# Patient Record
Sex: FEMALE | Race: OTHER | NOT HISPANIC OR LATINO
[De-identification: names, ages, dates, MRNs, and addresses within clinical notes are randomized per-mention and may not be internally consistent; named-entity substitution may affect disease eponyms.]

---

## 2022-01-06 ENCOUNTER — APPOINTMENT (OUTPATIENT)
Dept: SURGERY | Facility: CLINIC | Age: 52
End: 2022-01-06
Payer: COMMERCIAL

## 2022-01-06 VITALS
HEIGHT: 62 IN | HEART RATE: 94 BPM | SYSTOLIC BLOOD PRESSURE: 180 MMHG | WEIGHT: 143 LBS | BODY MASS INDEX: 26.31 KG/M2 | OXYGEN SATURATION: 98 % | DIASTOLIC BLOOD PRESSURE: 95 MMHG | TEMPERATURE: 98 F

## 2022-01-06 DIAGNOSIS — Z00.00 ENCOUNTER FOR GENERAL ADULT MEDICAL EXAMINATION W/OUT ABNORMAL FINDINGS: ICD-10-CM

## 2022-01-06 PROCEDURE — 99215 OFFICE O/P EST HI 40 MIN: CPT

## 2022-01-06 NOTE — HISTORY OF PRESENT ILLNESS
[FreeTextEntry1] : Patient referred by Dr. Martinez-primary\par \par I had the pleasure of seeing Dania Chowdhury in the office for a new visit breast evaluation secondary to recently diagnosed left breast atypical ductal hyperplasia.\par \par Dania is a zenon 50 yo perimenopausal female who has been in her usual state of health.  She denies dominant breast mass, skin changes or nipple discharge.\par \par She underwent abnormal breast imaging which demonstrated left breast upper outer quadrant calcifications. A stereotactic core biopsy demonstrated calcifications with atypical ductal hyperplasia.\par \par Pathology: 12/27/2021 Left breast upper outer quadrant- atypical ductal hyperplasia with calcifications. \par \par Imaging: \par 11/17/2021 Left diagnostic mammogram - left tight grouping of indeterminate calcifications \par BIRADS 4\par 10/18/2021 Bilateral screening mammogram with tomosynthesis\par Impression: 3mm group of calcifications in the mid depth left upper outer quadrant approximately 6cm from the nipple. BIRADS 0: Needs additional imaging\par \par \par The patient understands that the result of the biopsy is not carcinoma but a precursor to cancer which indicates that there is an elevated lifetime risk of developing breast carcinoma. We discussed the option of surgical excision to rule out carcinoma and the technical aspects of requiring a localization device, such as a wire, MAHAMED  or magseed.  She understands that goal of the surgery is to remove the area that was biopsied to rule out carcinoma. Risks v benefits were reviewed and discussed as well as alternative to surgical biopsy. All questions were answered.\par \par We also discussed chemoprevention and rationale of 5 years of tamoxifen or other agent to reduce to risk of developing hormone positive breast cancer by up to 50%. \par \par We also discussed high risk monitoring with mammogram/ultrasound and +/- MRI q 6 months along with clinical examination annually. \par \par She agrees to move forward with left breast localized excisional biopsy.

## 2022-01-06 NOTE — ASSESSMENT
[FreeTextEntry1] : 50 yo female presents with abnormal breast imaging from 10/2021 and 11/2021 demonstrating left breast microcalcifications that were biopsied as ADH. Discussion regarding surgical excision to rule out carcinoma, and referral to medical oncology for chemoprevention was had. The patient is moving forward with excisional biopsy.\par 1. Andie  left breast excisional biopsy\par 2. Referral to Dr. Martinez-chemoprevention\par 3. if biopsy demonstrates ADH and no carcinoma, patient will be enrolled in high risk surveillance program

## 2022-01-06 NOTE — PHYSICAL EXAM
[Normocephalic] : normocephalic [Atraumatic] : atraumatic [EOMI] : extra ocular movement intact [PERRL] : pupils equal, round and reactive to light [Sclera nonicteric] : sclera nonicteric [Supple] : supple [No Supraclavicular Adenopathy] : no supraclavicular adenopathy [Examined in the supine and seated position] : examined in the supine and seated position [No dominant masses] : no dominant masses in right breast  [No dominant masses] : no dominant masses left breast [No Nipple Retraction] : no left nipple retraction [No Nipple Discharge] : no left nipple discharge [No Axillary Lymphadenopathy] : no left axillary lymphadenopathy [No Edema] : no edema [No Rashes] : no rashes [No Ulceration] : no ulceration [Breast Nipple Inversion] : nipples not inverted [Breast Nipple Retraction] : nipples not retracted [Breast Nipple Flattening] : nipples not flattened [Breast Nipple Fissures] : nipples not fissured [Breast Abnormal Lactation (Galactorrhea)] : no galactorrhea [Breast Abnormal Secretion Bloody Fluid] : no bloody discharge [Breast Abnormal Secretion Serous Fluid] : no serous discharge [Breast Abnormal Secretion Opalescent Fluid] : no milky discharge [de-identified] : No supraclavicular or axillary adenopathy. No dominant breast mass, normal to palpation. Everted nipple without discharge. No skin changes. [de-identified] : No supraclavicular or axillary adenopathy. No dominant breast mass, normal to palpation. Everted nipple without discharge. No skin changes.

## 2022-01-27 ENCOUNTER — OUTPATIENT (OUTPATIENT)
Dept: OUTPATIENT SERVICES | Facility: HOSPITAL | Age: 52
LOS: 1 days | Discharge: HOME | End: 2022-01-27

## 2022-01-27 DIAGNOSIS — N60.92 UNSPECIFIED BENIGN MAMMARY DYSPLASIA OF LEFT BREAST: ICD-10-CM

## 2022-01-28 ENCOUNTER — RESULT REVIEW (OUTPATIENT)
Age: 52
End: 2022-01-28

## 2022-01-28 LAB — SURGICAL PATHOLOGY STUDY: SIGNIFICANT CHANGE UP

## 2022-02-08 ENCOUNTER — OUTPATIENT (OUTPATIENT)
Dept: OUTPATIENT SERVICES | Facility: HOSPITAL | Age: 52
LOS: 1 days | End: 2022-02-08
Payer: COMMERCIAL

## 2022-02-08 VITALS
HEART RATE: 93 BPM | SYSTOLIC BLOOD PRESSURE: 130 MMHG | WEIGHT: 142.86 LBS | HEIGHT: 62 IN | DIASTOLIC BLOOD PRESSURE: 70 MMHG | RESPIRATION RATE: 16 BRPM | TEMPERATURE: 98 F | OXYGEN SATURATION: 98 %

## 2022-02-08 DIAGNOSIS — N60.92 UNSPECIFIED BENIGN MAMMARY DYSPLASIA OF LEFT BREAST: ICD-10-CM

## 2022-02-08 DIAGNOSIS — Z29.9 ENCOUNTER FOR PROPHYLACTIC MEASURES, UNSPECIFIED: ICD-10-CM

## 2022-02-08 DIAGNOSIS — E11.9 TYPE 2 DIABETES MELLITUS WITHOUT COMPLICATIONS: ICD-10-CM

## 2022-02-08 DIAGNOSIS — E03.9 HYPOTHYROIDISM, UNSPECIFIED: ICD-10-CM

## 2022-02-08 DIAGNOSIS — Z13.89 ENCOUNTER FOR SCREENING FOR OTHER DISORDER: ICD-10-CM

## 2022-02-08 DIAGNOSIS — Z98.891 HISTORY OF UTERINE SCAR FROM PREVIOUS SURGERY: Chronic | ICD-10-CM

## 2022-02-08 DIAGNOSIS — Z92.29 PERSONAL HISTORY OF OTHER DRUG THERAPY: ICD-10-CM

## 2022-02-08 DIAGNOSIS — I10 ESSENTIAL (PRIMARY) HYPERTENSION: ICD-10-CM

## 2022-02-08 DIAGNOSIS — Z01.818 ENCOUNTER FOR OTHER PREPROCEDURAL EXAMINATION: ICD-10-CM

## 2022-02-08 DIAGNOSIS — O00.109 UNSPECIFIED TUBAL PREGNANCY WITHOUT INTRAUTERINE PREGNANCY: Chronic | ICD-10-CM

## 2022-02-08 LAB
A1C WITH ESTIMATED AVERAGE GLUCOSE RESULT: 7.7 % — HIGH (ref 4–5.6)
ANION GAP SERPL CALC-SCNC: 13 MMOL/L — SIGNIFICANT CHANGE UP (ref 5–17)
APTT BLD: 34 SEC — SIGNIFICANT CHANGE UP (ref 27.5–35.5)
BASOPHILS # BLD AUTO: 0.05 K/UL — SIGNIFICANT CHANGE UP (ref 0–0.2)
BASOPHILS NFR BLD AUTO: 0.6 % — SIGNIFICANT CHANGE UP (ref 0–2)
BLD GP AB SCN SERPL QL: SIGNIFICANT CHANGE UP
BUN SERPL-MCNC: 11.1 MG/DL — SIGNIFICANT CHANGE UP (ref 8–20)
CALCIUM SERPL-MCNC: 9.6 MG/DL — SIGNIFICANT CHANGE UP (ref 8.6–10.2)
CHLORIDE SERPL-SCNC: 100 MMOL/L — SIGNIFICANT CHANGE UP (ref 98–107)
CO2 SERPL-SCNC: 27 MMOL/L — SIGNIFICANT CHANGE UP (ref 22–29)
CREAT SERPL-MCNC: 0.55 MG/DL — SIGNIFICANT CHANGE UP (ref 0.5–1.3)
EOSINOPHIL # BLD AUTO: 0.25 K/UL — SIGNIFICANT CHANGE UP (ref 0–0.5)
EOSINOPHIL NFR BLD AUTO: 2.8 % — SIGNIFICANT CHANGE UP (ref 0–6)
ESTIMATED AVERAGE GLUCOSE: 174 MG/DL — HIGH (ref 68–114)
GLUCOSE SERPL-MCNC: 231 MG/DL — HIGH (ref 70–99)
HCG SERPL-ACNC: <4 MIU/ML — SIGNIFICANT CHANGE UP
HCT VFR BLD CALC: 40 % — SIGNIFICANT CHANGE UP (ref 34.5–45)
HGB BLD-MCNC: 12.6 G/DL — SIGNIFICANT CHANGE UP (ref 11.5–15.5)
IMM GRANULOCYTES NFR BLD AUTO: 0.5 % — SIGNIFICANT CHANGE UP (ref 0–1.5)
INR BLD: 1.06 RATIO — SIGNIFICANT CHANGE UP (ref 0.88–1.16)
LYMPHOCYTES # BLD AUTO: 2.86 K/UL — SIGNIFICANT CHANGE UP (ref 1–3.3)
LYMPHOCYTES # BLD AUTO: 32.6 % — SIGNIFICANT CHANGE UP (ref 13–44)
MCHC RBC-ENTMCNC: 26.6 PG — LOW (ref 27–34)
MCHC RBC-ENTMCNC: 31.5 GM/DL — LOW (ref 32–36)
MCV RBC AUTO: 84.4 FL — SIGNIFICANT CHANGE UP (ref 80–100)
MONOCYTES # BLD AUTO: 0.48 K/UL — SIGNIFICANT CHANGE UP (ref 0–0.9)
MONOCYTES NFR BLD AUTO: 5.5 % — SIGNIFICANT CHANGE UP (ref 2–14)
NEUTROPHILS # BLD AUTO: 5.1 K/UL — SIGNIFICANT CHANGE UP (ref 1.8–7.4)
NEUTROPHILS NFR BLD AUTO: 58 % — SIGNIFICANT CHANGE UP (ref 43–77)
PLATELET # BLD AUTO: 327 K/UL — SIGNIFICANT CHANGE UP (ref 150–400)
POTASSIUM SERPL-MCNC: 3.9 MMOL/L — SIGNIFICANT CHANGE UP (ref 3.5–5.3)
POTASSIUM SERPL-SCNC: 3.9 MMOL/L — SIGNIFICANT CHANGE UP (ref 3.5–5.3)
PROTHROM AB SERPL-ACNC: 12.3 SEC — SIGNIFICANT CHANGE UP (ref 10.6–13.6)
RBC # BLD: 4.74 M/UL — SIGNIFICANT CHANGE UP (ref 3.8–5.2)
RBC # FLD: 13.3 % — SIGNIFICANT CHANGE UP (ref 10.3–14.5)
SODIUM SERPL-SCNC: 140 MMOL/L — SIGNIFICANT CHANGE UP (ref 135–145)
WBC # BLD: 8.78 K/UL — SIGNIFICANT CHANGE UP (ref 3.8–10.5)
WBC # FLD AUTO: 8.78 K/UL — SIGNIFICANT CHANGE UP (ref 3.8–10.5)

## 2022-02-08 PROCEDURE — 93010 ELECTROCARDIOGRAM REPORT: CPT

## 2022-02-08 PROCEDURE — 93005 ELECTROCARDIOGRAM TRACING: CPT

## 2022-02-08 PROCEDURE — G0463: CPT

## 2022-02-08 RX ORDER — SIMVASTATIN 20 MG/1
0 TABLET, FILM COATED ORAL
Qty: 0 | Refills: 0 | DISCHARGE

## 2022-02-08 RX ORDER — LOSARTAN POTASSIUM 100 MG/1
0 TABLET, FILM COATED ORAL
Qty: 0 | Refills: 0 | DISCHARGE

## 2022-02-08 RX ORDER — CEFAZOLIN SODIUM 1 G
2000 VIAL (EA) INJECTION ONCE
Refills: 0 | Status: DISCONTINUED | OUTPATIENT
Start: 2022-03-11 | End: 2022-03-25

## 2022-02-08 NOTE — H&P PST ADULT - HISTORY OF PRESENT ILLNESS
51 year old female with history of recently diagnosed left breast atypical ductal hyperplasia.  She denies dominant breast mass, skin changes or nipple discharge.  She underwent abnormal breast imaging which demonstrated left breast upper outer quadrant calcifications. A stereotactic core biopsy demonstrated calcifications with atypical ductal hyperplasia.    CHART REVIEW:    Pathology: 2021 Left breast upper outer quadrant- atypical ductal hyperplasia with calcifications.     [email or web address] Left diagnostic mammogram   Impression: left tight grouping of indeterminate calcifications BIRADS 4    10/18/2021 Bilateral screening mammogram with tomosynthesis  Impression: 3mm group of calcifications in the mid depth left upper outer quadrant approximately 6cm from the nipple. BIRADS 0: Needs additional imaging     51 year old  female, LMP about 4-5 years ago,  with history of HTN, HLD, DM, hypothyroidism and recently diagnosed left breast atypical ductal hyperplasia, presents today for PST. Patient states she went for her regular breast imaging, which demonstrated left breast upper outer quadrant calcifications. A stereotactic core biopsy demonstrated calcifications with atypical ductal hyperplasia. She denies dominant breast mass, skin changes, nipple discharge or breast pain. States she is to undergo breast imaging on 2022. Denies family history of breast or uterine cancer. Denies genetic testing. Now scheduled for a left breast jamal  localized excisional biopsy with Dr. Tanner on 22 pending medical clearance.     CHART REVIEW:    Pathology: 2021 Left breast upper outer quadrant- atypical ductal hyperplasia with calcifications.     [email or web address] Left diagnostic mammogram   Impression: left tight grouping of indeterminate calcifications BIRADS 4    10/18/2021 Bilateral screening mammogram with tomosynthesis  Impression: 3mm group of calcifications in the mid depth left upper outer quadrant approximately 6cm from the nipple. BIRADS 0: Needs additional imaging

## 2022-02-08 NOTE — H&P PST ADULT - ATTENDING COMMENTS
Patient undergoing left breast jamal  localized excisional biopsy for ADH. She understands risks v benefits and technical aspects all questions were answered.

## 2022-02-08 NOTE — H&P PST ADULT - PROBLEM SELECTOR PLAN 4
BP today 130/70. EKG performed.  Patient instructed to take BP medications day of procedure with a sip of water.  Medical clearance pending

## 2022-02-08 NOTE — H&P PST ADULT - PROBLEM SELECTOR PROBLEM 7
BMI:   HbA1c:   Glucose:   BP: 117/68 (12-19-20 @ 00:06) (117/68 - 117/70)  Lipid Panel:  COVID-19 vaccine series completed

## 2022-02-08 NOTE — H&P PST ADULT - PROBLEM SELECTOR PLAN 3
A1C level ordered and performed  Finger stick on day of procedure.  Asked the patient not to take metformin or Januvia on the day of procedure.  Medical clearance pending

## 2022-02-08 NOTE — H&P PST ADULT - PROBLEM SELECTOR PLAN 1
Continue medications as instructed.  Patient instructed to take levothyroxine with a sip of water day of procedure.  medical clearance pending

## 2022-02-08 NOTE — H&P PST ADULT - NSANTHOSAYNRD_GEN_A_CORE
No. GORDON screening performed.  STOP BANG Legend: 0-2 = LOW Risk; 3-4 = INTERMEDIATE Risk; 5-8 = HIGH Risk

## 2022-02-08 NOTE — H&P PST ADULT - NSICDXFAMILYHX_GEN_ALL_CORE_FT
FAMILY HISTORY:  FH: thyroid disease, 2 sister  FHx: diabetes mellitus, sister    Mother  Still living? Unknown  FH: CAD (coronary artery disease), Age at diagnosis: Age Unknown

## 2022-02-08 NOTE — H&P PST ADULT - NEGATIVE ENMT SYMPTOMS
no hearing difficulty/no ear pain/no tinnitus/no nasal congestion/no nasal discharge/no nasal obstruction/no nose bleeds/no gum bleeding/no dry mouth/no throat pain/no dysphagia

## 2022-02-08 NOTE — H&P PST ADULT - EKG AND INTERPRETATION
Mercedes Flap Text: The defect edges were debeveled with a #15 scalpel blade.  Given the location of the defect, shape of the defect and the proximity to free margins a Mercedes flap was deemed most appropriate.  Using a sterile surgical marker, an appropriate advancement flap was drawn incorporating the defect and placing the expected incisions within the relaxed skin tension lines where possible. The area thus outlined was incised deep to adipose tissue with a #15 scalpel blade.  The skin margins were undermined to an appropriate distance in all directions utilizing iris scissors. unofficial reading: NSR possible left atrial enlargement VR 93 bpm

## 2022-02-08 NOTE — H&P PST ADULT - ASSESSMENT
This is a pleasant 51 year old  female in [place], LMP about 4-5 years ago,  with history of HTN, HLD, DM, hypothyroidism and recently diagnosed left breast atypical ductal hyperplasia, presents today for PST. Patient states she went for her regular breast imaging, which demonstrated left breast upper outer quadrant calcifications. A stereotactic core biopsy demonstrated calcifications with atypical ductal hyperplasia. She denies dominant breast mass, skin changes, nipple discharge or breast pain. States she is to undergo breast imaging on 2022. Denies family history of breast or uterine cancer. Denies genetic testing. Now scheduled for a left breast jamal  localized excisional biopsy with Dr. Tanner on 22 pending medical clearance.     CAPRINI SCORE    AGE RELATED RISK FACTORS                                                             [X ] Age 41-60 years                                            (1 Point)  [ ] Age: 61-74 years                                           (2 Points)                 [ ] Age= 75 years                                                (3 Points)             DISEASE RELATED RISK FACTORS                                                       [ ] Edema in the lower extremities                 (1 Point)                     [ ] Varicose veins                                               (1 Point)                                 [X ] BMI > 25 Kg/m2                                            (1 Point)                                  [ ] Serious infection (ie PNA)                            (1 Point)                     [ ] Lung disease ( COPD, Emphysema)            (1 Point)                                                                          [ ] Acute myocardial infarction                         (1 Point)                  [ ] Congestive heart failure (in the previous month)  (1 Point)         [ ] Inflammatory bowel disease                            (1 Point)                  [ ] Central venous access, PICC or Port               (2 points)       (within the last month)                                                                [ ] Stroke (in the previous month)                        (5 Points)    [ ] Previous or present malignancy                       (2 points)                                                                                                                                                         HEMATOLOGY RELATED FACTORS                                                         [ ] Prior episodes of VTE                                     (3 Points)                     [ ] Positive family history for VTE                      (3 Points)                  [ ] Prothrombin 73249 A                                     (3 Points)                     [ ] Factor V Leiden                                                (3 Points)                        [ ] Lupus anticoagulants                                      (3 Points)                                                           [ ] Anticardiolipin antibodies                              (3 Points)                                                       [ ] High homocysteine in the blood                   (3 Points)                                             [ ] Other congenital or acquired thrombophilia      (3 Points)                                                [ ] Heparin induced thrombocytopenia                  (3 Points)                                        MOBILITY RELATED FACTORS  [ ] Bed rest                                                         (1 Point)  [ ] Plaster cast                                                    (2 points)  [ ] Bed bound for more than 72 hours           (2 Points)    GENDER SPECIFIC FACTORS  [ ] Pregnancy or had a baby within the last month   (1 Point)  [ ] Post-partum < 6 weeks                                   (1 Point)  [ ] Hormonal therapy  or oral contraception   (1 Point)  [ ] History of pregnancy complications              (1 point)  [ ] Unexplained or recurrent              (1 Point)    OTHER RISK FACTORS                                           (1 Point)  [X ] BMI >40, smoking, diabetes requiring insulin, chemotherapy  blood transfusions and length of surgery over 2 hours    SURGERY RELATED RISK FACTORS  [ ]  Section within the last month     (1 Point)  [ ] Minor surgery                                                  (1 Point)  [ ] Arthroscopic surgery                                       (2 Points)  [X ] Planned major surgery lasting more            (2 Points)      than 45 minutes     [ ] Elective hip or knee joint replacement       (5 points)       surgery                                                TRAUMA RELATED RISK FACTORS  [ ] Fracture of the hip, pelvis, or leg                       (5 Points)  [ ] Spinal cord injury resulting in paralysis             (5 points)       (in the previous month)    [ ] Paralysis  (less than 1 month)                             (5 Points)  [ ] Multiple Trauma within 1 month                        (5 Points)    Total Score [    5    ]    Caprini Score 0-2: Low Risk, NO VTE prophylaxis required for most patients, encourage ambulation  Caprini Score 3-6: Moderate Risk , pharmacologic VTE prophylaxis is indicated for most patients (in the absence of contraindications)  Caprini Score Greater than or =7: High risk, pharmocologic VTE prophylaxis indicated for most patients (in the absence of contraindications)    OPIOID RISK TOOL    AMANDA EACH BOX THAT APPLIES AND ADD TOTALS AT THE END    FAMILY HISTORY OF SUBSTANCE ABUSE                 FEMALE         MALE                                                Alcohol                             [  ]1 pt          [  ]3pts                                               Illegal Durgs                     [  ]2 pts        [  ]3pts                                               Rx Drugs                           [  ]4 pts        [  ]4 pts    PERSONAL HISTORY OF SUBSTANCE ABUSE                                                                                          Alcohol                             [  ]3 pts       [  ]3 pts                                               Illegal Drugs                     [  ]4 pts        [  ]4 pts                                               Rx Drugs                           [  ]5 pts        [  ]5 pts    AGE BETWEEN 16-45 YEARS                                      [  ]1 pt         [  ]1 pt    HISTORY OF PREADOLESCENT   SEXUAL ABUSE                                                             [  ]3 pts        [  ]0pts    PSYCHOLOGICAL DISEASE                     ADD, OCD, Bipolar, Schizophrenia        [  ]2 pts         [  ]2 pts                      Depression                                               [  ]1 pt           [  ]1 pt           SCORING TOTAL   (add numbers and type here)              (**0*)                                     A score of 3 or lower indicated LOW risk for future opioid abuse  A score of 4 to 7 indicated moderate risk for future opioid abuse  A score of 8 or higher indicates a high risk for opioid abuse

## 2022-02-08 NOTE — ASU PATIENT PROFILE, ADULT - FALL HARM RISK - UNIVERSAL INTERVENTIONS
Bed in lowest position, wheels locked, appropriate side rails in place/Call bell, personal items and telephone in reach/Instruct patient to call for assistance before getting out of bed or chair/Non-slip footwear when patient is out of bed/[place] to call system/Physically safe environment - no spills, clutter or unnecessary equipment/Purposeful Proactive Rounding/Room/bathroom lighting operational, light cord in reach

## 2022-02-08 NOTE — H&P PST ADULT - PROBLEM SELECTOR PLAN 2
Labs and EKG ordered and performed.  Written and verbal instructions provided.  Now scheduled for a left breast jamal  localized excisional biopsy with Dr. Tanner on 2/25/22 pending medical clearance.   Patient to have medical clearance with Dr. Martinez  Patient educated on surgical scrub, COVID testing 2/22, preadmission instructions, medical clearance and day of procedure medications, verbalizes understanding, teach back method utilized.  Patient instructed to stop ASA/Herbals or anti-inflammatory meds one week prior to surgery and discuss with PCP

## 2022-02-17 PROBLEM — E78.5 HYPERLIPIDEMIA, UNSPECIFIED: Chronic | Status: ACTIVE | Noted: 2022-02-08

## 2022-02-17 PROBLEM — I10 ESSENTIAL (PRIMARY) HYPERTENSION: Chronic | Status: ACTIVE | Noted: 2022-02-08

## 2022-02-17 PROBLEM — E03.9 HYPOTHYROIDISM, UNSPECIFIED: Chronic | Status: ACTIVE | Noted: 2022-02-08

## 2022-02-17 PROBLEM — E11.9 TYPE 2 DIABETES MELLITUS WITHOUT COMPLICATIONS: Chronic | Status: ACTIVE | Noted: 2022-02-08

## 2022-03-03 ENCOUNTER — APPOINTMENT (OUTPATIENT)
Dept: MAMMOGRAPHY | Facility: CLINIC | Age: 52
End: 2022-03-03
Payer: COMMERCIAL

## 2022-03-03 ENCOUNTER — OUTPATIENT (OUTPATIENT)
Dept: OUTPATIENT SERVICES | Facility: HOSPITAL | Age: 52
LOS: 1 days | End: 2022-03-03
Payer: COMMERCIAL

## 2022-03-03 DIAGNOSIS — O00.109 UNSPECIFIED TUBAL PREGNANCY WITHOUT INTRAUTERINE PREGNANCY: Chronic | ICD-10-CM

## 2022-03-03 DIAGNOSIS — Z98.891 HISTORY OF UTERINE SCAR FROM PREVIOUS SURGERY: Chronic | ICD-10-CM

## 2022-03-03 DIAGNOSIS — N60.92 UNSPECIFIED BENIGN MAMMARY DYSPLASIA OF LEFT BREAST: ICD-10-CM

## 2022-03-03 PROCEDURE — C1739: CPT

## 2022-03-03 PROCEDURE — 19281 PERQ DEVICE BREAST 1ST IMAG: CPT | Mod: LT

## 2022-03-03 PROCEDURE — 19281 PERQ DEVICE BREAST 1ST IMAG: CPT

## 2022-03-10 ENCOUNTER — TRANSCRIPTION ENCOUNTER (OUTPATIENT)
Age: 52
End: 2022-03-10

## 2022-03-11 ENCOUNTER — OUTPATIENT (OUTPATIENT)
Dept: INPATIENT UNIT | Facility: HOSPITAL | Age: 52
LOS: 1 days | End: 2022-03-11
Payer: COMMERCIAL

## 2022-03-11 ENCOUNTER — APPOINTMENT (OUTPATIENT)
Dept: SURGERY | Facility: HOSPITAL | Age: 52
End: 2022-03-11
Payer: COMMERCIAL

## 2022-03-11 ENCOUNTER — RESULT REVIEW (OUTPATIENT)
Age: 52
End: 2022-03-11

## 2022-03-11 VITALS
SYSTOLIC BLOOD PRESSURE: 149 MMHG | OXYGEN SATURATION: 100 % | DIASTOLIC BLOOD PRESSURE: 96 MMHG | RESPIRATION RATE: 18 BRPM | TEMPERATURE: 98 F | HEART RATE: 94 BPM | WEIGHT: 142.86 LBS | HEIGHT: 62 IN

## 2022-03-11 VITALS
TEMPERATURE: 97 F | SYSTOLIC BLOOD PRESSURE: 123 MMHG | HEART RATE: 76 BPM | DIASTOLIC BLOOD PRESSURE: 79 MMHG | RESPIRATION RATE: 13 BRPM | OXYGEN SATURATION: 100 %

## 2022-03-11 DIAGNOSIS — N60.92 UNSPECIFIED BENIGN MAMMARY DYSPLASIA OF LEFT BREAST: ICD-10-CM

## 2022-03-11 DIAGNOSIS — O00.109 UNSPECIFIED TUBAL PREGNANCY WITHOUT INTRAUTERINE PREGNANCY: Chronic | ICD-10-CM

## 2022-03-11 DIAGNOSIS — Z98.891 HISTORY OF UTERINE SCAR FROM PREVIOUS SURGERY: Chronic | ICD-10-CM

## 2022-03-11 LAB — GLUCOSE BLDC GLUCOMTR-MCNC: 180 MG/DL — HIGH (ref 70–99)

## 2022-03-11 PROCEDURE — 76098 X-RAY EXAM SURGICAL SPECIMEN: CPT | Mod: 26

## 2022-03-11 PROCEDURE — 82962 GLUCOSE BLOOD TEST: CPT

## 2022-03-11 PROCEDURE — 88307 TISSUE EXAM BY PATHOLOGIST: CPT | Mod: 26

## 2022-03-11 PROCEDURE — 76098 X-RAY EXAM SURGICAL SPECIMEN: CPT

## 2022-03-11 PROCEDURE — 19125 EXCISION BREAST LESION: CPT | Mod: LT

## 2022-03-11 PROCEDURE — 88307 TISSUE EXAM BY PATHOLOGIST: CPT

## 2022-03-11 PROCEDURE — 14001 TIS TRNFR TRUNK 10.1-30SQCM: CPT

## 2022-03-11 RX ORDER — ACETAMINOPHEN 500 MG
975 TABLET ORAL ONCE
Refills: 0 | Status: COMPLETED | OUTPATIENT
Start: 2022-03-11 | End: 2022-03-11

## 2022-03-11 RX ORDER — OXYCODONE AND ACETAMINOPHEN 5; 325 MG/1; MG/1
1 TABLET ORAL
Qty: 20 | Refills: 0
Start: 2022-03-11

## 2022-03-11 RX ORDER — SIMVASTATIN 20 MG/1
1 TABLET, FILM COATED ORAL
Qty: 0 | Refills: 0 | DISCHARGE

## 2022-03-11 RX ORDER — SODIUM CHLORIDE 9 MG/ML
3 INJECTION INTRAMUSCULAR; INTRAVENOUS; SUBCUTANEOUS ONCE
Refills: 0 | Status: DISCONTINUED | OUTPATIENT
Start: 2022-03-11 | End: 2022-03-11

## 2022-03-11 RX ORDER — LOSARTAN POTASSIUM 100 MG/1
1 TABLET, FILM COATED ORAL
Qty: 0 | Refills: 0 | DISCHARGE

## 2022-03-11 RX ORDER — FENTANYL CITRATE 50 UG/ML
25 INJECTION INTRAVENOUS
Refills: 0 | Status: DISCONTINUED | OUTPATIENT
Start: 2022-03-11 | End: 2022-03-11

## 2022-03-11 RX ORDER — LEVOTHYROXINE SODIUM 125 MCG
0 TABLET ORAL
Qty: 0 | Refills: 0 | DISCHARGE

## 2022-03-11 RX ORDER — SODIUM CHLORIDE 9 MG/ML
1000 INJECTION, SOLUTION INTRAVENOUS
Refills: 0 | Status: DISCONTINUED | OUTPATIENT
Start: 2022-03-11 | End: 2022-03-11

## 2022-03-11 RX ORDER — PREGABALIN 225 MG/1
1 CAPSULE ORAL
Qty: 0 | Refills: 0 | DISCHARGE

## 2022-03-11 RX ORDER — METFORMIN HYDROCHLORIDE 850 MG/1
0 TABLET ORAL
Qty: 0 | Refills: 0 | DISCHARGE

## 2022-03-11 RX ADMIN — Medication 975 MILLIGRAM(S): at 06:42

## 2022-03-11 NOTE — ASU DISCHARGE PLAN (ADULT/PEDIATRIC) - NS MD DC FALL RISK RISK
For information on Fall & Injury Prevention, visit: [email or web address] OR  [email or web address] OR  https://www.cdc.gov/steadi/patient.html

## 2022-03-11 NOTE — ASU DISCHARGE PLAN (ADULT/PEDIATRIC) - CARE PROVIDER_API CALL
Jenae Tanner)  Surgery  [address], [address] [place] [address]  Phone: [phone]  Fax: [phone]  Follow Up Time: 2 weeks

## 2022-03-11 NOTE — ASU DISCHARGE PLAN (ADULT/PEDIATRIC) - ASU DC SPECIAL INSTRUCTIONSFT
Followup Dr. Tanner in 2 weeks.  Tylenol [id number], [id number] for mild to moderate pain  Percocet 5/325mg 1-2 tabs q6hprn for severe pain  Senna/colace/miralax prn for constipation  Keep dressing on and dry for 2 days, afterwards remove and shower only. Avoid pools and baths for 2 weeks.

## 2022-03-11 NOTE — BRIEF OPERATIVE NOTE - OPERATION/FINDINGS
Left breast lumpectomy with ANDIE , mass excised, confirmed with probe and Andie  film post excision with biopsy and ANDIE clip in place. Oncolplastic closure with reapproximation of defect with 2-0 vicryl and complex layered closure of skin with deep dermal 3-0 vicryl and 4-0 monocryl running subcutincular

## 2022-03-11 NOTE — ASU DISCHARGE PLAN (ADULT/PEDIATRIC) - CALL YOUR DOCTOR IF YOU HAVE ANY OF THE FOLLOWING:
Bleeding that does not stop/Swelling that gets worse/Pain not relieved by Medications/Fever greater than (need to indicate Fahrenheit or Celsius)/Wound/Surgical Site with redness, or foul smelling discharge or pus/Numbness, tingling, color or temperature change to extremity/Nausea and vomiting that does not stop/Increased irritability or sluggishness

## 2022-03-16 ENCOUNTER — OUTPATIENT (OUTPATIENT)
Dept: OUTPATIENT SERVICES | Facility: HOSPITAL | Age: 52
LOS: 1 days | End: 2022-03-16
Payer: COMMERCIAL

## 2022-03-16 ENCOUNTER — RESULT REVIEW (OUTPATIENT)
Age: 52
End: 2022-03-16

## 2022-03-16 DIAGNOSIS — N60.92 UNSPECIFIED BENIGN MAMMARY DYSPLASIA OF LEFT BREAST: ICD-10-CM

## 2022-03-16 DIAGNOSIS — O00.109 UNSPECIFIED TUBAL PREGNANCY WITHOUT INTRAUTERINE PREGNANCY: Chronic | ICD-10-CM

## 2022-03-16 DIAGNOSIS — Z98.891 HISTORY OF UTERINE SCAR FROM PREVIOUS SURGERY: Chronic | ICD-10-CM

## 2022-03-16 PROCEDURE — 88321 CONSLTJ&REPRT SLD PREP ELSWR: CPT

## 2022-03-17 LAB — SURGICAL PATHOLOGY STUDY: SIGNIFICANT CHANGE UP

## 2022-03-22 ENCOUNTER — OUTPATIENT (OUTPATIENT)
Dept: OUTPATIENT SERVICES | Facility: HOSPITAL | Age: 52
LOS: 1 days | Discharge: HOME | End: 2022-03-22

## 2022-03-22 DIAGNOSIS — Z98.891 HISTORY OF UTERINE SCAR FROM PREVIOUS SURGERY: Chronic | ICD-10-CM

## 2022-03-22 DIAGNOSIS — N60.92 UNSPECIFIED BENIGN MAMMARY DYSPLASIA OF LEFT BREAST: ICD-10-CM

## 2022-03-22 DIAGNOSIS — O00.109 UNSPECIFIED TUBAL PREGNANCY WITHOUT INTRAUTERINE PREGNANCY: Chronic | ICD-10-CM

## 2022-03-22 LAB — SURGICAL PATHOLOGY STUDY: SIGNIFICANT CHANGE UP

## 2022-03-24 ENCOUNTER — APPOINTMENT (OUTPATIENT)
Dept: HEMATOLOGY ONCOLOGY | Facility: CLINIC | Age: 52
End: 2022-03-24
Payer: COMMERCIAL

## 2022-03-24 VITALS
OXYGEN SATURATION: 99 % | HEIGHT: 61.5 IN | HEART RATE: 112 BPM | BODY MASS INDEX: 27.03 KG/M2 | DIASTOLIC BLOOD PRESSURE: 96 MMHG | WEIGHT: 145 LBS | SYSTOLIC BLOOD PRESSURE: 160 MMHG

## 2022-03-24 DIAGNOSIS — Z80.7 FAMILY HISTORY OF OTHER MALIGNANT NEOPLASMS OF LYMPHOID, HEMATOPOIETIC AND RELATED TISSUES: ICD-10-CM

## 2022-03-24 DIAGNOSIS — Z78.9 OTHER SPECIFIED HEALTH STATUS: ICD-10-CM

## 2022-03-24 PROCEDURE — 99205 OFFICE O/P NEW HI 60 MIN: CPT

## 2022-03-24 PROCEDURE — 99204 OFFICE O/P NEW MOD 45 MIN: CPT

## 2022-03-24 RX ORDER — LOSARTAN POTASSIUM 100 MG/1
TABLET, FILM COATED ORAL
Refills: 0 | Status: ACTIVE | COMMUNITY

## 2022-03-24 RX ORDER — SIMVASTATIN 80 MG/1
TABLET, FILM COATED ORAL
Refills: 0 | Status: ACTIVE | COMMUNITY

## 2022-03-24 RX ORDER — CHOLECALCIFEROL (VITAMIN D3) 125 MCG
50 MCG TABLET ORAL
Qty: 90 | Refills: 3 | Status: ACTIVE | COMMUNITY
Start: 2022-03-24 | End: 1900-01-01

## 2022-03-24 RX ORDER — METFORMIN HYDROCHLORIDE 500 MG/1
500 TABLET, COATED ORAL
Refills: 0 | Status: ACTIVE | COMMUNITY

## 2022-03-24 RX ORDER — LEVOTHYROXINE SODIUM 0.17 MG/1
TABLET ORAL
Refills: 0 | Status: ACTIVE | COMMUNITY

## 2022-03-24 NOTE — CONSULT LETTER
[Dear  ___] : Dear  [unfilled], [Consult Letter:] : I had the pleasure of evaluating your patient, [unfilled]. [Please see my note below.] : Please see my note below. [Consult Closing:] : Thank you very much for allowing me to participate in the care of this patient.  If you have any questions, please do not hesitate to contact me. [Sincerely,] : Sincerely, [DrNancy  ___] : Dr. ROTH [FreeTextEntry3] : Leeanna Martinez MD\par Medical Oncology/Hematology\par [facility], [facility]\par \par \par [facility] [place]\par

## 2022-03-24 NOTE — ASSESSMENT
[FreeTextEntry1] : 51 year old postmenopausal female diagnosed with left breast atypical ductal hyperplasia in 1/2022. Underwent left breast excisional biopsy on 3/11/2022, pathology demonstrated Atypical Ductal Hyperplasia.\par \par Today we discussed the available radiographic and pathologic data in detail. We also discussed the natural history of the disease, as well as management options. Discussed ADH is a high risk lesion associated with increased risk of breast cancer.  Discussed both AIs and Tamoxifen. Discussed Letrozole and its side effects which may include hot flashes, vaginal dryness musculoskeletal symptoms (stiffness, arthritic pain, achiness), worsen bone density - thinning of the bones (osteopenia/osteoporosis). Alternatively, discussed Tamoxifen which can cause hot flashes, vaginal dryness, mood changes, elevation in live enzymes, increased VTE/stroke risk, risk of endometrial cancer. Discussed the duration of treatment will be for 5 years. \par \par Plan:\par - Start Letrozole \par - Will need Baseline DEXA \par - Start Vit D (2,000 units)\par - Follow-up with Dr. Tanner\par - Follow-up in 3 months

## 2022-03-24 NOTE — ADDENDUM
[FreeTextEntry1] : Documented by Anthony Olae acting as scribe for Dr. Martinez on 03/24/2022. \par \par All Medical record entries made by the Scribe were at my, Dr. Martinez's, direction and personally dictated by me on 03/24/2022. I have reviewed the chart and agree that the record accurately reflects my personal performance of the history, physical exam, assessment and plan. I have also personally directed, reviewed, and agreed with the discharge instructions.

## 2022-03-24 NOTE — HISTORY OF PRESENT ILLNESS
[de-identified] : Ms. Chowdhury is a 51 year old female diagnosed with  left breast atypical ductal hyperplasia in 2022 at age 51.\par \par Patient obtained screening mammogram on 10/18/21 demonstrating left breast grouped calcifications at the mid depth left upper outer quadrant approximately 6 CMFN. On 21 obtained diagnostic left mammogram demonstrating focal tight grouping of indeterminate calcifications of left breast with recommendation for biopsy. On 2022 underwent left breast upper outer quadrant calcifications, core biopsy, pathology demonstrating Atypical duct hyperplasia with calcifications.\par \par 3/11/2022 Left breast, jamal  localized excisional biopsy, pathology demonstrated Atypical Ductal Hyperplasia. Fibrocystic changes (including fibrosis, microcyst, sclerosing\par adenosis, columnar cell change and usual duct hyperplasia).\par \par PMHx: HTN, Diabetes Type II (), HDL\par FMHx: Father Multiple Myeloma? \par Sx:  \par Socx: Non-smoker\par Denies medical allergies \par \par Patient presents today with initial visit. \par Menopause, 7 years ago\par

## 2022-03-29 ENCOUNTER — APPOINTMENT (OUTPATIENT)
Dept: BREAST CENTER | Facility: CLINIC | Age: 52
End: 2022-03-29
Payer: COMMERCIAL

## 2022-03-29 ENCOUNTER — APPOINTMENT (OUTPATIENT)
Dept: SURGERY | Facility: CLINIC | Age: 52
End: 2022-03-29

## 2022-03-29 VITALS
OXYGEN SATURATION: 96 % | HEIGHT: 61.5 IN | TEMPERATURE: 97.7 F | SYSTOLIC BLOOD PRESSURE: 146 MMHG | WEIGHT: 143 LBS | DIASTOLIC BLOOD PRESSURE: 88 MMHG | BODY MASS INDEX: 26.65 KG/M2 | HEART RATE: 108 BPM

## 2022-03-29 PROCEDURE — 99024 POSTOP FOLLOW-UP VISIT: CPT

## 2022-03-29 NOTE — HISTORY OF PRESENT ILLNESS
[FreeTextEntry1] : Patient referred by Dr. Martinez-primary\par \par I had the pleasure of seeing Dania Chowdhury in the office for a post operative visit.\par \par Dania is a zenon 52 yo perimenopausal female who has been in her usual state of health.  She denies dominant breast mass, skin changes or nipple discharge.\par \par She underwent abnormal breast imaging which demonstrated left breast upper outer quadrant calcifications. A stereotactic core biopsy demonstrated calcifications with atypical ductal hyperplasia.\par \par She underwent left breast jamal  excisional biopsy on 3/11/2022.  Final pathology demonstrated ADH with a foci of ADH approaching DCIS.\par \par Pathology: 12/27/2021 Left breast upper outer quadrant- atypical ductal hyperplasia with calcifications. \par \par Imaging: \par 11/17/2021 Left diagnostic mammogram - left tight grouping of indeterminate calcifications \par BIRADS 4\par 10/18/2021 Bilateral screening mammogram with tomosynthesis\par Impression: 3mm group of calcifications in the mid depth left upper outer quadrant approximately 6cm from the nipple. BIRADS 0: Needs additional imaging\par \par 3/11/2022  Surgical pathology\par Left breast jamal  localized excisional biopsy:  Atypical ductal hyperplasia ( see comment).  Fibrocystic changes ( including fibrosis, microcyst, sclerosing adenosis, columnar cell change and usual duct hyperplasia).  Biopsy site with fibrosis, old hemorrhage, foreign body reavtion, and chronic inflammation.  \par Comment:  There are a foci of atypical ductal hyperplasia approaching DCIS.  Margins appear free of atypical hyperplasia.\par \par We reviewed final pathology and final pathology demonstrated ADH.  She understands there was  a foci of atypical ductal hyperplasia approaching DCIS.  Margins are clear.  She will be entered into my high risk clinic.  Recommendation for followup with medical oncology and follow up 6 months after screening mammogram/sonogram in October.  \par \par She understands and agrees with plan.  All questions answered.\par

## 2022-03-29 NOTE — PHYSICAL EXAM
[Normocephalic] : normocephalic [Atraumatic] : atraumatic [EOMI] : extra ocular movement intact [PERRL] : pupils equal, round and reactive to light [Sclera nonicteric] : sclera nonicteric [Supple] : supple [No Supraclavicular Adenopathy] : no supraclavicular adenopathy [Examined in the supine and seated position] : examined in the supine and seated position [No dominant masses] : no dominant masses in right breast  [No dominant masses] : no dominant masses left breast [No Nipple Retraction] : no left nipple retraction [No Nipple Discharge] : no left nipple discharge [No Axillary Lymphadenopathy] : no left axillary lymphadenopathy [No Edema] : no edema [No Rashes] : no rashes [No Ulceration] : no ulceration [Breast Nipple Inversion] : nipples not inverted [Breast Nipple Retraction] : nipples not retracted [Breast Nipple Flattening] : nipples not flattened [Breast Nipple Fissures] : nipples not fissured [Breast Abnormal Lactation (Galactorrhea)] : no galactorrhea [Breast Abnormal Secretion Bloody Fluid] : no bloody discharge [Breast Abnormal Secretion Serous Fluid] : no serous discharge [Breast Abnormal Secretion Opalescent Fluid] : no milky discharge [de-identified] : No supraclavicular or axillary adenopathy. No dominant breast mass, normal to palpation. Everted nipple without discharge. No skin changes. [de-identified] : No supraclavicular or axillary adenopathy. No dominant breast mass, normal to palpation. Everted nipple without discharge. Periareolar excision healing well.

## 2022-03-29 NOTE — ASSESSMENT
[FreeTextEntry1] : 50 yo underwent left breast jamal  excisional biopsy on 3/11/2022.  Final pathology demonstrated ADH with  a foci of atypical ductal hyperplasia approaching DCIS.  Margins are clear.  She will be beginning Letrozole today.  She will be entered into my high risk clinic.  Recommendation for followup with medical oncology and follow up 6 months after screening mammogram/sonogram in October.  \par 1. Follow up in October\par 2. Follow up with Dr. Martinez\par 3. Screening mammogram/sonogram due in October 2022

## 2022-05-31 ENCOUNTER — OUTPATIENT (OUTPATIENT)
Dept: OUTPATIENT SERVICES | Facility: HOSPITAL | Age: 52
LOS: 1 days | Discharge: HOME | End: 2022-05-31

## 2022-05-31 DIAGNOSIS — Z98.891 HISTORY OF UTERINE SCAR FROM PREVIOUS SURGERY: Chronic | ICD-10-CM

## 2022-05-31 DIAGNOSIS — O00.109 UNSPECIFIED TUBAL PREGNANCY WITHOUT INTRAUTERINE PREGNANCY: Chronic | ICD-10-CM

## 2022-05-31 DIAGNOSIS — N60.92 UNSPECIFIED BENIGN MAMMARY DYSPLASIA OF LEFT BREAST: ICD-10-CM

## 2022-06-06 ENCOUNTER — APPOINTMENT (OUTPATIENT)
Dept: HEMATOLOGY ONCOLOGY | Facility: CLINIC | Age: 52
End: 2022-06-06
Payer: COMMERCIAL

## 2022-06-06 VITALS
DIASTOLIC BLOOD PRESSURE: 84 MMHG | WEIGHT: 145 LBS | OXYGEN SATURATION: 99 % | BODY MASS INDEX: 27.03 KG/M2 | SYSTOLIC BLOOD PRESSURE: 146 MMHG | HEIGHT: 61.5 IN | HEART RATE: 100 BPM

## 2022-06-06 PROCEDURE — 99214 OFFICE O/P EST MOD 30 MIN: CPT

## 2022-06-06 NOTE — ASSESSMENT
[FreeTextEntry1] : 51 year old postmenopausal female diagnosed with left breast atypical ductal hyperplasia in 1/2022. Underwent left breast excisional biopsy on 3/11/2022, pathology demonstrated ADH.\par Began letrozole on 3/24/22\par \par Reviewed Vitamin D, 25-H was 55 on 5/27/22 - labs done at [facility]. \par \par Plan:\par -Hot flashes  due to AI - >4 a day, decline Gabapentin\par -continue Letrozole \par -bone density pending at [facility] with Dr. Kinga Martinez\par - continue Vit D (2,000 units)\par - Follow-up with Dr. Tanner\par - Follow-up in 4 months

## 2022-06-06 NOTE — HISTORY OF PRESENT ILLNESS
[de-identified] : Ms. Chowdhury is a 51 year old female diagnosed with  left breast atypical ductal hyperplasia in 2022 at age 51.\par \par Patient obtained screening mammogram on 10/18/21 demonstrating left breast grouped calcifications at the mid depth left upper outer quadrant approximately 6 CMFN. On 21 obtained diagnostic left mammogram demonstrating focal tight grouping of indeterminate calcifications of left breast with recommendation for biopsy. On 2022 underwent left breast upper outer quadrant calcifications, core biopsy, pathology demonstrating Atypical duct hyperplasia with calcifications.\par \par 3/11/2022 Left breast, jamal  localized excisional biopsy, pathology demonstrated Atypical Ductal Hyperplasia. Fibrocystic changes (including fibrosis, microcyst, sclerosing\par adenosis, columnar cell change and usual duct hyperplasia).\par \par PMHx: HTN, Diabetes Type II (), HDL\par FMHx: Father Multiple Myeloma? \par Sx:  \par Socx: Non-smoker\par Denies medical allergies \par \par Patient presents today with initial visit. \par Menopause, 7 years ago\par  [de-identified] : Returns for follow up\par On Letrozole 2.5 mg since  3/24/22\par Notes hot flashes >4 times per day, also occurring at night\par She notes pain in R heel and Right knee.\par

## 2022-06-06 NOTE — CONSULT LETTER
[Please see my note below.] : Please see my note below. [Sincerely,] : Sincerely, [DrNancy  ___] : Dr. ROTH [Dear  ___] : Dear ~MERNA, [Courtesy Letter:] : I had the pleasure of seeing your patient, [unfilled], in my office today. [FreeTextEntry3] : Leeanna Martinez MD\par Medical Oncology/Hematology\par [facility] [place], [facility]\par \par \par [facility] at [facility]\par

## 2022-09-29 ENCOUNTER — OUTPATIENT (OUTPATIENT)
Dept: OUTPATIENT SERVICES | Facility: HOSPITAL | Age: 52
LOS: 1 days | Discharge: HOME | End: 2022-09-29

## 2022-09-29 DIAGNOSIS — Z98.891 HISTORY OF UTERINE SCAR FROM PREVIOUS SURGERY: Chronic | ICD-10-CM

## 2022-09-29 DIAGNOSIS — O00.109 UNSPECIFIED TUBAL PREGNANCY WITHOUT INTRAUTERINE PREGNANCY: Chronic | ICD-10-CM

## 2022-09-29 DIAGNOSIS — N60.92 UNSPECIFIED BENIGN MAMMARY DYSPLASIA OF LEFT BREAST: ICD-10-CM

## 2022-10-03 ENCOUNTER — APPOINTMENT (OUTPATIENT)
Dept: HEMATOLOGY ONCOLOGY | Facility: CLINIC | Age: 52
End: 2022-10-03

## 2022-10-03 ENCOUNTER — RESULT REVIEW (OUTPATIENT)
Age: 52
End: 2022-10-03

## 2022-10-03 VITALS
SYSTOLIC BLOOD PRESSURE: 172 MMHG | HEART RATE: 95 BPM | WEIGHT: 143.01 LBS | HEIGHT: 61.5 IN | DIASTOLIC BLOOD PRESSURE: 85 MMHG | OXYGEN SATURATION: 98 % | BODY MASS INDEX: 26.66 KG/M2

## 2022-10-03 LAB
BASOPHILS # BLD AUTO: 0.1 K/UL — SIGNIFICANT CHANGE UP (ref 0–0.2)
BASOPHILS NFR BLD AUTO: 0.6 % — SIGNIFICANT CHANGE UP (ref 0–2)
EOSINOPHIL # BLD AUTO: 0.2 K/UL — SIGNIFICANT CHANGE UP (ref 0–0.5)
EOSINOPHIL NFR BLD AUTO: 2.7 % — SIGNIFICANT CHANGE UP (ref 0–6)
HCT VFR BLD CALC: 41.7 % — SIGNIFICANT CHANGE UP (ref 34.5–45)
HGB BLD-MCNC: 13 G/DL — SIGNIFICANT CHANGE UP (ref 11.5–15.5)
LYMPHOCYTES # BLD AUTO: 3.1 K/UL — SIGNIFICANT CHANGE UP (ref 1–3.3)
LYMPHOCYTES # BLD AUTO: 35.7 % — SIGNIFICANT CHANGE UP (ref 13–44)
MCHC RBC-ENTMCNC: 27.8 PG — SIGNIFICANT CHANGE UP (ref 27–34)
MCHC RBC-ENTMCNC: 31.3 G/DL — LOW (ref 32–36)
MCV RBC AUTO: 88.7 FL — SIGNIFICANT CHANGE UP (ref 80–100)
MONOCYTES # BLD AUTO: 0.5 K/UL — SIGNIFICANT CHANGE UP (ref 0–0.9)
MONOCYTES NFR BLD AUTO: 5.8 % — SIGNIFICANT CHANGE UP (ref 2–14)
NEUTROPHILS # BLD AUTO: 4.9 K/UL — SIGNIFICANT CHANGE UP (ref 1.8–7.4)
NEUTROPHILS NFR BLD AUTO: 55.2 % — SIGNIFICANT CHANGE UP (ref 43–77)
PLATELET # BLD AUTO: 285 K/UL — SIGNIFICANT CHANGE UP (ref 150–400)
RBC # BLD: 4.7 M/UL — SIGNIFICANT CHANGE UP (ref 3.8–5.2)
RBC # FLD: 12.9 % — SIGNIFICANT CHANGE UP (ref 10.3–14.5)
WBC # BLD: 8.8 K/UL — SIGNIFICANT CHANGE UP (ref 3.8–10.5)
WBC # FLD AUTO: 8.8 K/UL — SIGNIFICANT CHANGE UP (ref 3.8–10.5)

## 2022-10-03 PROCEDURE — 99214 OFFICE O/P EST MOD 30 MIN: CPT

## 2022-10-04 LAB
25(OH)D3 SERPL-MCNC: 46.3 NG/ML
ALBUMIN SERPL ELPH-MCNC: 4.7 G/DL
ALP BLD-CCNC: 87 U/L
ALT SERPL-CCNC: 25 U/L
ANION GAP SERPL CALC-SCNC: 14 MMOL/L
AST SERPL-CCNC: 16 U/L
BILIRUB SERPL-MCNC: 0.3 MG/DL
BUN SERPL-MCNC: 8 MG/DL
CALCIUM SERPL-MCNC: 9.8 MG/DL
CHLORIDE SERPL-SCNC: 99 MMOL/L
CO2 SERPL-SCNC: 26 MMOL/L
CREAT SERPL-MCNC: 0.5 MG/DL
EGFR: 111 ML/MIN/1.73M2
GLUCOSE SERPL-MCNC: 205 MG/DL
POTASSIUM SERPL-SCNC: 4.5 MMOL/L
PROT SERPL-MCNC: 7.4 G/DL
SODIUM SERPL-SCNC: 139 MMOL/L

## 2022-10-07 NOTE — ASSESSMENT
[FreeTextEntry1] : 51 year old postmenopausal female diagnosed with left breast atypical ductal hyperplasia in 1/2022. Underwent left breast excisional biopsy on 3/11/2022, pathology demonstrated ADH.\par Began letrozole on 3/24/22\par \par Continues on Letrozole, tolerating well. \par \par Plan:\par -Continue Letrozole, refilled \par -Hot flashes- significantly improved \par -Will obtain DEXA from [facility] with Dr. Kinga Martinez\par -Continue vitamin D3 daily. Vitamin D level ordered. \par -Follow-up with Dr. Tanner, pending mammo/sono \par -Advised to call with concerns/symptoms \par -Follow-up in 4 months with Dr Martinez or sooner if needed

## 2022-10-07 NOTE — CONSULT LETTER
[Dear  ___] : Dear ~MERNA, [Courtesy Letter:] : I had the pleasure of seeing your patient, [unfilled], in my office today. [Please see my note below.] : Please see my note below. [Sincerely,] : Sincerely, [DrNancy  ___] : Dr. ROTH [FreeTextEntry3] : Leeanna Martinez MD\par Medical Oncology/Hematology\par [facility] [place], [facility]\par \par \par [facility] at [facility]\par

## 2022-10-07 NOTE — HISTORY OF PRESENT ILLNESS
[de-identified] : Ms. Chowdhury is a 51 year old female diagnosed with  left breast atypical ductal hyperplasia in 2022 at age 51.\par \par Patient obtained screening mammogram on 10/18/21 demonstrating left breast grouped calcifications at the mid depth left upper outer quadrant approximately 6 CMFN. On 21 obtained diagnostic left mammogram demonstrating focal tight grouping of indeterminate calcifications of left breast with recommendation for biopsy. On 2022 underwent left breast upper outer quadrant calcifications, core biopsy, pathology demonstrating Atypical duct hyperplasia with calcifications.\par \par 3/11/2022 Left breast, jamal  localized excisional biopsy, pathology demonstrated Atypical Ductal Hyperplasia. Fibrocystic changes (including fibrosis, microcyst, sclerosing\par adenosis, columnar cell change and usual duct hyperplasia).\par \par PMHx: HTN, Diabetes Type II (), HDL\par FMHx: Father Multiple Myeloma? \par Sx:  \par Socx: Non-smoker\par Denies medical allergies \par \par Patient presents today with initial visit. \par Menopause, 7 years ago\par  [de-identified] : Returns for follow up\par \par On Letrozole 2.5 mg since 3/24/22\par Hot flashes has decreased, now occurring once a day, not impacting QOL \par Has vaginal dryness, pending f/u with GYN Nora Colbert NP ([facility]) \par Denies ha, dizziness, pain\par Denies dyspnea\par Denies abdominal pain, nausea, vomiting\par Good appetite. No weight loss \par Feels well

## 2022-10-07 NOTE — PHYSICAL EXAM
[Normal] : no JVD, no calf tenderness, venous stasis changes, varices [de-identified] : no palpable breast mass or axillary adenopathy bilaterally

## 2022-11-01 ENCOUNTER — APPOINTMENT (OUTPATIENT)
Dept: SURGERY | Facility: CLINIC | Age: 52
End: 2022-11-01

## 2022-11-21 ENCOUNTER — APPOINTMENT (OUTPATIENT)
Dept: SURGERY | Facility: CLINIC | Age: 52
End: 2022-11-21

## 2022-11-21 VITALS
BODY MASS INDEX: 26.81 KG/M2 | DIASTOLIC BLOOD PRESSURE: 98 MMHG | WEIGHT: 142 LBS | OXYGEN SATURATION: 100 % | HEART RATE: 99 BPM | TEMPERATURE: 97.7 F | HEIGHT: 61 IN | SYSTOLIC BLOOD PRESSURE: 161 MMHG

## 2022-11-21 PROCEDURE — 99213 OFFICE O/P EST LOW 20 MIN: CPT

## 2022-11-21 NOTE — HISTORY OF PRESENT ILLNESS
[FreeTextEntry1] : Problem List:\par 1.  Left breast atypical ductal hyperplasia \par      - Left breast MAHAMED  localized excisional biopsy 3/11/22\par      - Letrozole started 3/24/22\par \par Med Onc - Dr. Leeanna Martinez\par \par \par INTERVAL HISTORY:\par 52 year old female, former patient of Dr. Tanner's who presents for 6 month followup. She is currently on Letrozole. She does experience some symptoms including high blood pressure, hot flashes, but states they are tolerable and does not want to stop or switch medications. She does occasionally do self-breast exams. She denies breast pain, nipple discharge, or abnormal masses. \par \par HPI: \par Patient initially presented in January 2022 w/abnormal breast imaging which demonstrated left breast upper outer quadrant calcifications. A stereotactic core biopsy demonstrated calcifications with atypical ductal hyperplasia\par \par \par IMAGING:\par None recent

## 2022-11-21 NOTE — ASSESSMENT
[FreeTextEntry1] : 51 yo underwent left breast jamal  excisional biopsy on 3/11/2022.  Final pathology demonstrated ADH with  a foci of atypical ductal hyperplasia approaching DCIS.  She is currently on Letrozole. \par \par CBE was benign. She is due for her annual mammogram now. \par \par We discussed the clinical significance of being high-risk for breast cancer and the implications for additional screening. This would include both biannual clinical breast exams as well as imaging screening with mammogram and MRI (in 6-month intervals). We discussed that MRI screening does not improve overall survival from breast cancer, but has been shown to detect breast cancer earlier, resulting in smaller surgeries, less invasive treatment, and less morbidity. We discussed risks of MRI including contrast administration, possibility of additional imaging/biopsies, as well as the contrast accumulation in the brain (which has not shown any clinical significance to date). We will obtain her first MRI in 6 months. I will see her back in 6 months after her MRI. \par \par Patient verbalized understanding for all treatment plans discussed. All of her questions were answered to the best of my ability. She was encouraged to call the office if any questions or concerns or come in sooner if needed.\par \par \par \par Plan:  \par 1. Follow up 6 months after the MRI is performed\par 2. Follow up with Dr. Martinez\par 3. Mammogram/US now at [facility]\par 4. MRI in 6 months at [facility]

## 2022-11-21 NOTE — ADDENDUM
[FreeTextEntry1] : This note was written by Benita Harris, acting as the  for Dr. Patel. This note accurately reflects the work and decisions made by Dr. Patel.

## 2022-11-21 NOTE — PHYSICAL EXAM
[Normocephalic] : normocephalic [Atraumatic] : atraumatic [EOMI] : extra ocular movement intact [PERRL] : pupils equal, round and reactive to light [Sclera nonicteric] : sclera nonicteric [Supple] : supple [No Supraclavicular Adenopathy] : no supraclavicular adenopathy [Examined in the supine and seated position] : examined in the supine and seated position [No dominant masses] : no dominant masses in right breast  [No dominant masses] : no dominant masses left breast [No Nipple Retraction] : no left nipple retraction [No Nipple Discharge] : no left nipple discharge [No Axillary Lymphadenopathy] : no left axillary lymphadenopathy [No Edema] : no edema [No Rashes] : no rashes [No Ulceration] : no ulceration [No Cervical Adenopathy] : no cervical adenopathy [Symmetrical] : symmetrical [Grade 1] : Ptosis Grade 1 [Breast Nipple Inversion] : nipples not inverted [Breast Nipple Retraction] : nipples not retracted [Breast Nipple Flattening] : nipples not flattened [Breast Nipple Fissures] : nipples not fissured [Breast Abnormal Lactation (Galactorrhea)] : no galactorrhea [Breast Abnormal Secretion Bloody Fluid] : no bloody discharge [Breast Abnormal Secretion Serous Fluid] : no serous discharge [Breast Abnormal Secretion Opalescent Fluid] : no milky discharge [de-identified] : No supraclavicular or axillary adenopathy. No dominant breast mass, normal to palpation. Everted nipple without discharge. No skin changes. [de-identified] : No supraclavicular or axillary adenopathy. No dominant breast mass, normal to palpation. Everted nipple without discharge. Periareolar excision healing well.

## 2022-11-21 NOTE — REVIEW OF SYSTEMS
[Negative] : Heme/Lymph [As Noted in HPI] : as noted in HPI [Itching] : itching [Hot Flashes] : hot flashes [Breast Pain] : no breast pain [Breast Lump] : no breast lump [Change In Color Of Skin] : no change in skin color [Change In Skin Texture] : no change in skin texture [Nipple Discharge] : no nipple discharge

## 2022-12-14 ENCOUNTER — APPOINTMENT (OUTPATIENT)
Dept: MRI IMAGING | Facility: CLINIC | Age: 52
End: 2022-12-14

## 2022-12-14 ENCOUNTER — NON-APPOINTMENT (OUTPATIENT)
Age: 52
End: 2022-12-14

## 2022-12-14 ENCOUNTER — OUTPATIENT (OUTPATIENT)
Dept: OUTPATIENT SERVICES | Facility: HOSPITAL | Age: 52
LOS: 1 days | End: 2022-12-14

## 2022-12-14 DIAGNOSIS — O00.109 UNSPECIFIED TUBAL PREGNANCY WITHOUT INTRAUTERINE PREGNANCY: Chronic | ICD-10-CM

## 2022-12-14 DIAGNOSIS — Z00.8 ENCOUNTER FOR OTHER GENERAL EXAMINATION: ICD-10-CM

## 2022-12-14 DIAGNOSIS — Z98.891 HISTORY OF UTERINE SCAR FROM PREVIOUS SURGERY: Chronic | ICD-10-CM

## 2022-12-14 PROCEDURE — 77049 MRI BREAST C-+ W/CAD BI: CPT | Mod: 26

## 2023-01-06 ENCOUNTER — APPOINTMENT (OUTPATIENT)
Dept: MRI IMAGING | Facility: CLINIC | Age: 53
End: 2023-01-06
Payer: COMMERCIAL

## 2023-01-06 ENCOUNTER — RESULT REVIEW (OUTPATIENT)
Age: 53
End: 2023-01-06

## 2023-01-06 ENCOUNTER — TRANSCRIPTION ENCOUNTER (OUTPATIENT)
Age: 53
End: 2023-01-06

## 2023-01-06 PROCEDURE — 19085Z: CUSTOM

## 2023-01-06 PROCEDURE — A4648: CPT

## 2023-01-06 PROCEDURE — A9585: CPT

## 2023-01-06 PROCEDURE — 77065 DX MAMMO INCL CAD UNI: CPT | Mod: LT

## 2023-02-02 ENCOUNTER — OUTPATIENT (OUTPATIENT)
Dept: OUTPATIENT SERVICES | Facility: HOSPITAL | Age: 53
LOS: 1 days | Discharge: HOME | End: 2023-02-02

## 2023-02-02 DIAGNOSIS — O00.109 UNSPECIFIED TUBAL PREGNANCY WITHOUT INTRAUTERINE PREGNANCY: Chronic | ICD-10-CM

## 2023-02-02 DIAGNOSIS — N60.92 UNSPECIFIED BENIGN MAMMARY DYSPLASIA OF LEFT BREAST: ICD-10-CM

## 2023-02-02 DIAGNOSIS — Z98.891 HISTORY OF UTERINE SCAR FROM PREVIOUS SURGERY: Chronic | ICD-10-CM

## 2023-02-08 ENCOUNTER — RESULT REVIEW (OUTPATIENT)
Age: 53
End: 2023-02-08

## 2023-02-08 ENCOUNTER — APPOINTMENT (OUTPATIENT)
Dept: HEMATOLOGY ONCOLOGY | Facility: CLINIC | Age: 53
End: 2023-02-08
Payer: COMMERCIAL

## 2023-02-08 VITALS
BODY MASS INDEX: 25.3 KG/M2 | WEIGHT: 134.01 LBS | DIASTOLIC BLOOD PRESSURE: 93 MMHG | OXYGEN SATURATION: 99 % | SYSTOLIC BLOOD PRESSURE: 155 MMHG | HEIGHT: 61 IN | HEART RATE: 90 BPM

## 2023-02-08 LAB
BASOPHILS # BLD AUTO: 0.1 K/UL — SIGNIFICANT CHANGE UP (ref 0–0.2)
BASOPHILS NFR BLD AUTO: 0.7 % — SIGNIFICANT CHANGE UP (ref 0–2)
EOSINOPHIL # BLD AUTO: 0.1 K/UL — SIGNIFICANT CHANGE UP (ref 0–0.5)
EOSINOPHIL NFR BLD AUTO: 1.7 % — SIGNIFICANT CHANGE UP (ref 0–6)
HCT VFR BLD CALC: 43.8 % — SIGNIFICANT CHANGE UP (ref 34.5–45)
HGB BLD-MCNC: 14.2 G/DL — SIGNIFICANT CHANGE UP (ref 11.5–15.5)
LYMPHOCYTES # BLD AUTO: 3 K/UL — SIGNIFICANT CHANGE UP (ref 1–3.3)
LYMPHOCYTES # BLD AUTO: 36.4 % — SIGNIFICANT CHANGE UP (ref 13–44)
MCHC RBC-ENTMCNC: 28 PG — SIGNIFICANT CHANGE UP (ref 27–34)
MCHC RBC-ENTMCNC: 32.4 G/DL — SIGNIFICANT CHANGE UP (ref 32–36)
MCV RBC AUTO: 86.3 FL — SIGNIFICANT CHANGE UP (ref 80–100)
MONOCYTES # BLD AUTO: 0.4 K/UL — SIGNIFICANT CHANGE UP (ref 0–0.9)
MONOCYTES NFR BLD AUTO: 4.9 % — SIGNIFICANT CHANGE UP (ref 2–14)
NEUTROPHILS # BLD AUTO: 4.7 K/UL — SIGNIFICANT CHANGE UP (ref 1.8–7.4)
NEUTROPHILS NFR BLD AUTO: 56.3 % — SIGNIFICANT CHANGE UP (ref 43–77)
PLATELET # BLD AUTO: 298 K/UL — SIGNIFICANT CHANGE UP (ref 150–400)
RBC # BLD: 5.08 M/UL — SIGNIFICANT CHANGE UP (ref 3.8–5.2)
RBC # FLD: 13 % — SIGNIFICANT CHANGE UP (ref 10.3–14.5)
WBC # BLD: 8.3 K/UL — SIGNIFICANT CHANGE UP (ref 3.8–10.5)
WBC # FLD AUTO: 8.3 K/UL — SIGNIFICANT CHANGE UP (ref 3.8–10.5)

## 2023-02-08 PROCEDURE — 99214 OFFICE O/P EST MOD 30 MIN: CPT

## 2023-02-08 RX ORDER — EMPAGLIFLOZIN 10 MG/1
10 TABLET, FILM COATED ORAL
Refills: 0 | Status: ACTIVE | COMMUNITY
Start: 2023-02-08

## 2023-02-08 RX ORDER — SITAGLIPTIN 50 MG/1
50 TABLET, FILM COATED ORAL
Refills: 0 | Status: DISCONTINUED | COMMUNITY
End: 2023-02-08

## 2023-02-08 NOTE — CONSULT LETTER
[Dear  ___] : Dear ~MERNA, [Courtesy Letter:] : I had the pleasure of seeing your patient, [unfilled], in my office today. [Please see my note below.] : Please see my note below. [Sincerely,] : Sincerely, [DrNancy  ___] : Dr. ROTH [FreeTextEntry3] : Leeanna Martinez MD\par Medical Oncology/Hematology\par [facility] [place], [facility]\par \par \par [facility] at [facility] [place]\par

## 2023-02-08 NOTE — ASSESSMENT
[FreeTextEntry1] : 52 year old postmenopausal female diagnosed with left breast atypical ductal hyperplasia in 1/2022. Underwent left breast excisional biopsy on 3/11/2022, pathology demonstrated ADH.\par Began letrozole on 3/24/22\par \par Continues on Letrozole, tolerating well. \par CBC from today is WNL\par Reviewed 7/1/2022 DEXA- normal bone density.\par Reviewed 12/14/2022 MR Breast- 8 mm of focal mass enhancement in the slightly lower outer left breast ([id number]). MRI guided core biopsy is recommended.\par \par 1/6/2023 Left breast outer mass MR guided biopsy- 5mm Fibroadenoma\par \par Plan:\par -Continue Letrozole daily, refilled \par -Hot flashes- significantly improved \par -Continue vitamin D3 daily. Vitamin D level ordered. Repeat DEXA 7/2024\par -Mammo/sono overdue, will follow up with Dr. Patel regarding when to obtain due to recent MRI\par -Pt defers antidepressant at this time \par -Follow up in 6 months

## 2023-02-08 NOTE — HISTORY OF PRESENT ILLNESS
[de-identified] : Ms. Chowdhury is a 51 year old female diagnosed with  left breast atypical ductal hyperplasia in 2022 at age 51.\par \par Patient obtained screening mammogram on 10/18/21 demonstrating left breast grouped calcifications at the mid depth left upper outer quadrant approximately 6 CMFN. On 21 obtained diagnostic left mammogram demonstrating focal tight grouping of indeterminate calcifications of left breast with recommendation for biopsy. On 2022 underwent left breast upper outer quadrant calcifications, core biopsy, pathology demonstrating Atypical duct hyperplasia with calcifications.\par \par 3/11/2022 Left breast, jamal  localized excisional biopsy, pathology demonstrated Atypical Ductal Hyperplasia. Fibrocystic changes (including fibrosis, microcyst, sclerosing\par adenosis, columnar cell change and usual duct hyperplasia).\par \par PMHx: HTN, Diabetes Type II (), HDL\par FMHx: Father Multiple Myeloma? \par Sx:  \par Socx: Non-smoker\par Denies medical allergies \par \par Patient presents today with initial visit. \par Menopause, 7 years ago\par  [de-identified] : Returns for follow up\par Continues on Letrozole daily, notes tolerating well\par S/p left breast MR biopsy on 1/6/2023 with benign findings \par Rarely occurring hot flashes \par Taking vitamin D3 daily \par Resolved vaginal dryness \par No joint pain/stiffness \par Notes feeling of sadness at times,  recovering from prostate cancer

## 2023-02-08 NOTE — PHYSICAL EXAM
[Normal] : affect appropriate [de-identified] : no palpable breast mass or axillary adenopathy bilaterally

## 2023-02-09 LAB
25(OH)D3 SERPL-MCNC: 58.5 NG/ML
ALBUMIN SERPL ELPH-MCNC: 4.6 G/DL
ALP BLD-CCNC: 83 U/L
ALT SERPL-CCNC: 20 U/L
ANION GAP SERPL CALC-SCNC: 13 MMOL/L
AST SERPL-CCNC: 14 U/L
BILIRUB SERPL-MCNC: 0.3 MG/DL
BUN SERPL-MCNC: 13 MG/DL
CALCIUM SERPL-MCNC: 9.7 MG/DL
CHLORIDE SERPL-SCNC: 105 MMOL/L
CO2 SERPL-SCNC: 25 MMOL/L
CREAT SERPL-MCNC: 0.54 MG/DL
EGFR: 109 ML/MIN/1.73M2
GLUCOSE SERPL-MCNC: 154 MG/DL
POTASSIUM SERPL-SCNC: 4.7 MMOL/L
PROT SERPL-MCNC: 7.5 G/DL
SODIUM SERPL-SCNC: 143 MMOL/L

## 2023-03-17 ENCOUNTER — APPOINTMENT (OUTPATIENT)
Dept: ULTRASOUND IMAGING | Facility: CLINIC | Age: 53
End: 2023-03-17
Payer: COMMERCIAL

## 2023-03-17 ENCOUNTER — OUTPATIENT (OUTPATIENT)
Dept: OUTPATIENT SERVICES | Facility: HOSPITAL | Age: 53
LOS: 1 days | End: 2023-03-17
Payer: COMMERCIAL

## 2023-03-17 ENCOUNTER — RESULT REVIEW (OUTPATIENT)
Age: 53
End: 2023-03-17

## 2023-03-17 DIAGNOSIS — Z98.891 HISTORY OF UTERINE SCAR FROM PREVIOUS SURGERY: Chronic | ICD-10-CM

## 2023-03-17 DIAGNOSIS — N60.92 UNSPECIFIED BENIGN MAMMARY DYSPLASIA OF LEFT BREAST: ICD-10-CM

## 2023-03-17 DIAGNOSIS — O00.109 UNSPECIFIED TUBAL PREGNANCY WITHOUT INTRAUTERINE PREGNANCY: Chronic | ICD-10-CM

## 2023-03-17 PROCEDURE — 77063 BREAST TOMOSYNTHESIS BI: CPT

## 2023-03-17 PROCEDURE — 76641 ULTRASOUND BREAST COMPLETE: CPT

## 2023-03-17 PROCEDURE — 77063 BREAST TOMOSYNTHESIS BI: CPT | Mod: 26

## 2023-03-17 PROCEDURE — 77067 SCR MAMMO BI INCL CAD: CPT

## 2023-03-17 PROCEDURE — 77067 SCR MAMMO BI INCL CAD: CPT | Mod: 26

## 2023-03-17 PROCEDURE — 76641 ULTRASOUND BREAST COMPLETE: CPT | Mod: 26,50

## 2023-03-24 ENCOUNTER — APPOINTMENT (OUTPATIENT)
Dept: ULTRASOUND IMAGING | Facility: CLINIC | Age: 53
End: 2023-03-24
Payer: COMMERCIAL

## 2023-03-24 ENCOUNTER — RESULT REVIEW (OUTPATIENT)
Age: 53
End: 2023-03-24

## 2023-03-24 ENCOUNTER — OUTPATIENT (OUTPATIENT)
Dept: OUTPATIENT SERVICES | Facility: HOSPITAL | Age: 53
LOS: 1 days | End: 2023-03-24
Payer: COMMERCIAL

## 2023-03-24 DIAGNOSIS — O00.109 UNSPECIFIED TUBAL PREGNANCY WITHOUT INTRAUTERINE PREGNANCY: Chronic | ICD-10-CM

## 2023-03-24 DIAGNOSIS — Z12.31 ENCOUNTER FOR SCREENING MAMMOGRAM FOR MALIGNANT NEOPLASM OF BREAST: ICD-10-CM

## 2023-03-24 DIAGNOSIS — Z98.891 HISTORY OF UTERINE SCAR FROM PREVIOUS SURGERY: Chronic | ICD-10-CM

## 2023-03-24 PROCEDURE — 76642 ULTRASOUND BREAST LIMITED: CPT

## 2023-03-24 PROCEDURE — 76642 ULTRASOUND BREAST LIMITED: CPT | Mod: 26,LT

## 2023-03-27 ENCOUNTER — APPOINTMENT (OUTPATIENT)
Dept: SURGERY | Facility: CLINIC | Age: 53
End: 2023-03-27
Payer: COMMERCIAL

## 2023-03-27 VITALS
BODY MASS INDEX: 25.3 KG/M2 | SYSTOLIC BLOOD PRESSURE: 163 MMHG | HEART RATE: 108 BPM | DIASTOLIC BLOOD PRESSURE: 93 MMHG | OXYGEN SATURATION: 99 % | WEIGHT: 134 LBS | TEMPERATURE: 97.4 F | HEIGHT: 61 IN

## 2023-03-27 PROCEDURE — 99213 OFFICE O/P EST LOW 20 MIN: CPT

## 2023-03-27 NOTE — ASSESSMENT
[FreeTextEntry1] : 53 yo underwent left breast jamal  excisional biopsy on 3/11/2022.  Final pathology demonstrated ADH with  a foci of atypical ductal hyperplasia approaching DCIS.  She is currently on Letrozole. \par \par CBE is benign. We reviewed her MRI, biopsy, US/mammogram from 2022/2023. Repeat US in 6 months is recommended to ensure stability of the left breast mass 2:00. I will see her after for her next CBE. MRI will be due in December. We discussed beginning to spread out the distance between her mammogram/MRI moving forward. \par \par \par Patient verbalized understanding for all treatment plans discussed. All of her questions were answered to the best of my ability. She was encouraged to call the office if any questions or concerns or come in sooner if needed.\par \par \par \par Plan:  \par 1.US left breast Sept 2023\par 2. Followup in Sept after imaging\par 3. MRI December 2023\par 4. Mammogram/US April 2024

## 2023-03-27 NOTE — PHYSICAL EXAM
[Normocephalic] : normocephalic [Atraumatic] : atraumatic [EOMI] : extra ocular movement intact [PERRL] : pupils equal, round and reactive to light [Sclera nonicteric] : sclera nonicteric [Supple] : supple [No Supraclavicular Adenopathy] : no supraclavicular adenopathy [No Cervical Adenopathy] : no cervical adenopathy [Examined in the supine and seated position] : examined in the supine and seated position [Symmetrical] : symmetrical [Grade 1] : Ptosis Grade 1 [No dominant masses] : no dominant masses in right breast  [No dominant masses] : no dominant masses left breast [No Nipple Retraction] : no left nipple retraction [No Nipple Discharge] : no left nipple discharge [No Axillary Lymphadenopathy] : no left axillary lymphadenopathy [No Edema] : no edema [No Rashes] : no rashes [No Ulceration] : no ulceration [Breast Nipple Inversion] : nipples not inverted [Breast Nipple Retraction] : nipples not retracted [Breast Nipple Flattening] : nipples not flattened [Breast Nipple Fissures] : nipples not fissured [Breast Abnormal Lactation (Galactorrhea)] : no galactorrhea [Breast Abnormal Secretion Bloody Fluid] : no bloody discharge [Breast Abnormal Secretion Serous Fluid] : no serous discharge [Breast Abnormal Secretion Opalescent Fluid] : no milky discharge [de-identified] : Well-healed aicha-areolar incision

## 2023-03-27 NOTE — REVIEW OF SYSTEMS
[As Noted in HPI] : as noted in HPI [Itching] : itching [Hot Flashes] : hot flashes [Negative] : Heme/Lymph [Breast Pain] : no breast pain [Breast Lump] : no breast lump [Change In Color Of Skin] : no change in skin color [Change In Skin Texture] : no change in skin texture [Nipple Discharge] : no nipple discharge

## 2023-03-27 NOTE — HISTORY OF PRESENT ILLNESS
[FreeTextEntry1] : Problem List:\par 1.  Left breast atypical ductal hyperplasia \par      - Left breast MAHAMED  localized excisional biopsy 3/11/22\par      - Letrozole started 3/24/22\par 2. Left breast 3:00 biopsy-proven fibroadenoma\par 3. Left breast 2:00 likely complicated cyst; BIRADS 3\par     \par \par CARE TEAM\par Med Onc - Dr. Leeanna Martinez\par \par \par INTERVAL HISTORY:\par - 52 year old female presents for 6 month followup. She is still on the letrozole and tolerating well. She has no complaints at this time. \par \par \par HPI:\par - Patient initially presented in January 2022 w/abnormal breast imaging which demonstrated left breast upper outer quadrant calcifications. A stereotactic core biopsy demonstrated calcifications with atypical ductal hyperplasia\par \par BREAST HISTORY: She does occasionally do self-breast exams. She denies breast pain, nipple discharge, or abnormal masses. \par \par IMAGING:\par 12/14/2388-Omemqupyq-SLY: 8mm focal NME in the slightly lower, outer left breast. Biopsy recommended.\par \par 01/06/23 - [facility]: Left Breast MR Biopsy - PATHOLOGY: Breast, Left Lower Outer Mass MRI Guided Core Biopsy = Fibroadenoma, 5 mm.\par \par 03/17/23 - [facility]: Bilateral Screening Mammogram & Complete Bilateral Breast Ultrasound - IMPRESSION: No mammographic evidence of malignancy. Indeterminate hypoechoic area in the region of previously described complicated cyst in the left breast (3:00), possibly corresponding with region of previous MRI biopsy. Targeted left breast ultrasound is recommended for better evaluation. Probably benign complicated cysts in the left breast (2;00). Targeted left breast ultrasound in 6 months is recommended to demonstrate stability. - BIRADS 0: Incomplete. Needs additional imaging evaluation.\par \par  3/24/54-Acukkmikc-QJ left breast: 3:00 7cm FN is a 1.0 x 0.5 x 0.5cm hypoechoic nodular area with a biopsy clip identified. This correlates with the area of recent MRI-guided biopsy. \par \par

## 2023-08-15 ENCOUNTER — OUTPATIENT (OUTPATIENT)
Dept: OUTPATIENT SERVICES | Facility: HOSPITAL | Age: 53
LOS: 1 days | Discharge: HOME | End: 2023-08-15

## 2023-08-15 DIAGNOSIS — O00.109 UNSPECIFIED TUBAL PREGNANCY WITHOUT INTRAUTERINE PREGNANCY: Chronic | ICD-10-CM

## 2023-08-15 DIAGNOSIS — N60.92 UNSPECIFIED BENIGN MAMMARY DYSPLASIA OF LEFT BREAST: ICD-10-CM

## 2023-08-15 DIAGNOSIS — Z98.891 HISTORY OF UTERINE SCAR FROM PREVIOUS SURGERY: Chronic | ICD-10-CM

## 2023-08-23 ENCOUNTER — LABORATORY RESULT (OUTPATIENT)
Age: 53
End: 2023-08-23

## 2023-08-23 ENCOUNTER — APPOINTMENT (OUTPATIENT)
Dept: HEMATOLOGY ONCOLOGY | Facility: CLINIC | Age: 53
End: 2023-08-23
Payer: COMMERCIAL

## 2023-08-23 VITALS
BODY MASS INDEX: 24.81 KG/M2 | SYSTOLIC BLOOD PRESSURE: 172 MMHG | HEIGHT: 61 IN | TEMPERATURE: 98.2 F | OXYGEN SATURATION: 98 % | HEART RATE: 80 BPM | DIASTOLIC BLOOD PRESSURE: 98 MMHG | WEIGHT: 131.4 LBS

## 2023-08-23 PROCEDURE — 99214 OFFICE O/P EST MOD 30 MIN: CPT

## 2023-08-23 NOTE — CONSULT LETTER
[Dear  ___] : Dear ~MERNA, [Courtesy Letter:] : I had the pleasure of seeing your patient, [unfilled], in my office today. [Please see my note below.] : Please see my note below. [Sincerely,] : Sincerely, [DrNancy  ___] : Dr. ROTH [FreeTextEntry3] : Leeanna Martinez MD\par  Medical Oncology/Hematology\par  [facility] [place], [facility]\par  \par  \par  [facility] [place]\par

## 2023-08-23 NOTE — HISTORY OF PRESENT ILLNESS
[de-identified] : Ms. Chowdhury is a 51 year old female diagnosed with  left breast atypical ductal hyperplasia in 2022 at age 51.\par  \par  Patient obtained screening mammogram on 10/18/21 demonstrating left breast grouped calcifications at the mid depth left upper outer quadrant approximately 6 CMFN. On 21 obtained diagnostic left mammogram demonstrating focal tight grouping of indeterminate calcifications of left breast with recommendation for biopsy. On 2022 underwent left breast upper outer quadrant calcifications, core biopsy, pathology demonstrating Atypical duct hyperplasia with calcifications.\par  \par  3/11/2022 Left breast, jaaml  localized excisional biopsy, pathology demonstrated Atypical Ductal Hyperplasia. Fibrocystic changes (including fibrosis, microcyst, sclerosing\par  adenosis, columnar cell change and usual duct hyperplasia).\par  \par  PMHx: HTN, Diabetes Type II (), HDL\par  FMHx: Father Multiple Myeloma? \par  Sx:  \par  Socx: Non-smoker\par  Denies medical allergies \par  \par  Patient presents today with initial visit. \par  Menopause, 7 years ago\par   [de-identified] : Returns for follow up Continues on Letrozole daily S/p left breast MR biopsy on 1/6/2023 with benign findings  She will be getting blood work done next week with PCP. She reports that she is getting used to the hot flashes. Notes white vaginal discharge x 1 month, now has resolved.  Vaginal dryness has improved.  She has lost 5 kgs, reports she is eating 2 meals per day.

## 2023-08-23 NOTE — ASSESSMENT
[FreeTextEntry1] : 52 year old postmenopausal female diagnosed with left breast atypical ductal hyperplasia in 1/2022. Underwent left breast excisional biopsy on 3/11/2022, pathology demonstrated ADH. Began letrozole on 3/24/22 7/1/2022 DEXA- normal bone density. 12/14/2022 MR Breast- 8 mm of focal mass enhancement in the slightly lower outer left breast (36636-012). MRI guided core biopsy is recommended.  1/6/2023 Left breast outer mass MR guided biopsy- 5mm Fibroadenoma  3/17/23 Mammogram/sono -No mammographic evidence of malignancy. Indeterminate hypoechoic area in the region of previously described complicated cyst in the left breast (3:00), possibly corresponding with region of previous MRI biopsy. Targeted left breast ultrasound is recommended for better evaluation. Probably benign complicated cysts in the left breast (2:00). Targeted left breast ultrasound in 6 months is recommended to demonstrate stability.  3/24/23 US breast-Postbiopsy changes of the left breast from prior MRI guided biopsy. The patient was informed of the results in person. Unless otherwise clinically indicated, follow-up breast MRI is recommended as described on MRI biopsy report.   Doing well on Letrozole.   Plan: -Continue Letrozole daily, refilled  -Hot flashes- improved  -Continue vitamin D3 daily. Vitamin D level ordered. Repeat DEXA 7/2024 -Left breast US in 9/2023  -Follow up with Dr. Patel as scheduled -Follow up in 6 months

## 2023-08-23 NOTE — PHYSICAL EXAM
Floor [Normal] : affect appropriate [de-identified] : no palpable breast mass or axillary adenopathy bilaterally

## 2023-09-29 ENCOUNTER — RESULT REVIEW (OUTPATIENT)
Age: 53
End: 2023-09-29

## 2023-09-29 ENCOUNTER — OUTPATIENT (OUTPATIENT)
Dept: OUTPATIENT SERVICES | Facility: HOSPITAL | Age: 53
LOS: 1 days | End: 2023-09-29
Payer: MEDICAID

## 2023-09-29 ENCOUNTER — APPOINTMENT (OUTPATIENT)
Dept: ULTRASOUND IMAGING | Facility: CLINIC | Age: 53
End: 2023-09-29
Payer: MEDICAID

## 2023-09-29 DIAGNOSIS — R92.8 OTHER ABNORMAL AND INCONCLUSIVE FINDINGS ON DIAGNOSTIC IMAGING OF BREAST: ICD-10-CM

## 2023-09-29 DIAGNOSIS — O00.109 UNSPECIFIED TUBAL PREGNANCY WITHOUT INTRAUTERINE PREGNANCY: Chronic | ICD-10-CM

## 2023-09-29 DIAGNOSIS — Z98.891 HISTORY OF UTERINE SCAR FROM PREVIOUS SURGERY: Chronic | ICD-10-CM

## 2023-09-29 PROCEDURE — 76642 ULTRASOUND BREAST LIMITED: CPT | Mod: 26,LT

## 2023-09-29 PROCEDURE — 76642 ULTRASOUND BREAST LIMITED: CPT

## 2023-10-02 ENCOUNTER — APPOINTMENT (OUTPATIENT)
Dept: SURGERY | Facility: CLINIC | Age: 53
End: 2023-10-02
Payer: MEDICAID

## 2023-10-02 VITALS
WEIGHT: 131 LBS | OXYGEN SATURATION: 99 % | HEIGHT: 61 IN | BODY MASS INDEX: 24.73 KG/M2 | TEMPERATURE: 98.1 F | SYSTOLIC BLOOD PRESSURE: 171 MMHG | DIASTOLIC BLOOD PRESSURE: 101 MMHG | HEART RATE: 83 BPM

## 2023-10-02 DIAGNOSIS — D24.2 BENIGN NEOPLASM OF LEFT BREAST: ICD-10-CM

## 2023-10-02 DIAGNOSIS — Z98.890 OTHER SPECIFIED POSTPROCEDURAL STATES: ICD-10-CM

## 2023-10-02 PROCEDURE — 99213 OFFICE O/P EST LOW 20 MIN: CPT

## 2024-01-08 ENCOUNTER — TRANSCRIPTION ENCOUNTER (OUTPATIENT)
Age: 54
End: 2024-01-08

## 2024-02-21 ENCOUNTER — OUTPATIENT (OUTPATIENT)
Dept: OUTPATIENT SERVICES | Facility: HOSPITAL | Age: 54
LOS: 1 days | Discharge: HOME | End: 2024-02-21

## 2024-02-21 DIAGNOSIS — Z98.891 HISTORY OF UTERINE SCAR FROM PREVIOUS SURGERY: Chronic | ICD-10-CM

## 2024-02-21 DIAGNOSIS — O00.109 UNSPECIFIED TUBAL PREGNANCY WITHOUT INTRAUTERINE PREGNANCY: Chronic | ICD-10-CM

## 2024-02-21 DIAGNOSIS — N60.92 UNSPECIFIED BENIGN MAMMARY DYSPLASIA OF LEFT BREAST: ICD-10-CM

## 2024-02-27 ENCOUNTER — APPOINTMENT (OUTPATIENT)
Dept: HEMATOLOGY ONCOLOGY | Facility: CLINIC | Age: 54
End: 2024-02-27
Payer: MEDICAID

## 2024-02-27 VITALS
WEIGHT: 132 LBS | OXYGEN SATURATION: 98 % | SYSTOLIC BLOOD PRESSURE: 168 MMHG | TEMPERATURE: 98.4 F | DIASTOLIC BLOOD PRESSURE: 98 MMHG | HEART RATE: 101 BPM | BODY MASS INDEX: 24.92 KG/M2 | HEIGHT: 61 IN

## 2024-02-27 DIAGNOSIS — Z51.81 ENCOUNTER FOR THERAPEUTIC DRUG LVL MONITORING: ICD-10-CM

## 2024-02-27 DIAGNOSIS — Z79.811 LONG TERM (CURRENT) USE OF AROMATASE INHIBITORS: ICD-10-CM

## 2024-02-27 DIAGNOSIS — R92.8 OTHER ABNORMAL AND INCONCLUSIVE FINDINGS ON DIAGNOSTIC IMAGING OF BREAST: ICD-10-CM

## 2024-02-27 DIAGNOSIS — Z79.899 ENCOUNTER FOR THERAPEUTIC DRUG LVL MONITORING: ICD-10-CM

## 2024-02-27 PROCEDURE — G2211 COMPLEX E/M VISIT ADD ON: CPT | Mod: NC,1L

## 2024-02-27 PROCEDURE — 99214 OFFICE O/P EST MOD 30 MIN: CPT

## 2024-02-27 NOTE — HISTORY OF PRESENT ILLNESS
[de-identified] : Ms. Chowdhury is a 51 year old female diagnosed with  left breast atypical ductal hyperplasia in 2022 at age 51.\par  \par  Patient obtained screening mammogram on 10/18/21 demonstrating left breast grouped calcifications at the mid depth left upper outer quadrant approximately 6 CMFN. On 21 obtained diagnostic left mammogram demonstrating focal tight grouping of indeterminate calcifications of left breast with recommendation for biopsy. On 2022 underwent left breast upper outer quadrant calcifications, core biopsy, pathology demonstrating Atypical duct hyperplasia with calcifications.\par  \par  3/11/2022 Left breast, jamal  localized excisional biopsy, pathology demonstrated Atypical Ductal Hyperplasia. Fibrocystic changes (including fibrosis, microcyst, sclerosing\par  adenosis, columnar cell change and usual duct hyperplasia).\par  \par  PMHx: HTN, Diabetes Type II (), HDL\par  FMHx: Father Multiple Myeloma? \par  Sx:  \par  Socx: Non-smoker\par  Denies medical allergies \par  \par  Patient presents today with initial visit. \par  Menopause, 7 years ago\par   [de-identified] : Returns for follow up Continues on Letrozole daily. Recently seen by GYN NP, was rxed treatment for fungal infection. Notes this happens often.

## 2024-02-27 NOTE — CONSULT LETTER
[Dear  ___] : Dear ~MERNA, [Courtesy Letter:] : I had the pleasure of seeing your patient, [unfilled], in my office today. [Please see my note below.] : Please see my note below. [Sincerely,] : Sincerely, [DrNancy  ___] : Dr. ROTH [FreeTextEntry3] : Leeanna Martinez MD\par  Medical Oncology/Hematology\par  [facility] [place], [facility]\par  \par  \par  [employer] [facility]\par

## 2024-02-27 NOTE — PHYSICAL EXAM
[Normal] : affect appropriate [de-identified] : no palpable breast mass or axillary adenopathy bilaterally

## 2024-02-27 NOTE — ASSESSMENT
[FreeTextEntry1] : 53 year old postmenopausal female diagnosed with left breast atypical ductal hyperplasia in 1/2022. Underwent left breast excisional biopsy on 3/11/2022, pathology demonstrated ADH. Began letrozole on 3/24/22 7/1/2022 DEXA- normal bone density. 12/14/2022 MR Breast- 8 mm of focal mass enhancement in the slightly lower outer left breast ([id number]). MRI guided core biopsy is recommended. 1/6/2023 Left breast outer mass MR guided biopsy- 5mm Fibroadenoma  Vitamin D, 25-hydroxy 48 (done at [facility])  Plan: -Continue Letrozole daily, refilled  -Hot flashes- improved  -Continue vitamin D3 daily.  Repeat DEXA 7/2024 -Follow up with Dr. Patel as scheduled -vaginal dryness - GYN eval pending 5/2024 -Follow up in 6 months

## 2024-03-27 ENCOUNTER — APPOINTMENT (OUTPATIENT)
Dept: MAMMOGRAPHY | Facility: CLINIC | Age: 54
End: 2024-03-27
Payer: MEDICAID

## 2024-03-27 ENCOUNTER — OUTPATIENT (OUTPATIENT)
Dept: OUTPATIENT SERVICES | Facility: HOSPITAL | Age: 54
LOS: 1 days | End: 2024-03-27
Payer: MEDICAID

## 2024-03-27 ENCOUNTER — APPOINTMENT (OUTPATIENT)
Dept: ULTRASOUND IMAGING | Facility: CLINIC | Age: 54
End: 2024-03-27
Payer: MEDICAID

## 2024-03-27 ENCOUNTER — RESULT REVIEW (OUTPATIENT)
Age: 54
End: 2024-03-27

## 2024-03-27 DIAGNOSIS — Z00.8 ENCOUNTER FOR OTHER GENERAL EXAMINATION: ICD-10-CM

## 2024-03-27 DIAGNOSIS — O00.109 UNSPECIFIED TUBAL PREGNANCY WITHOUT INTRAUTERINE PREGNANCY: Chronic | ICD-10-CM

## 2024-03-27 DIAGNOSIS — Z98.891 HISTORY OF UTERINE SCAR FROM PREVIOUS SURGERY: Chronic | ICD-10-CM

## 2024-03-27 DIAGNOSIS — N60.92 UNSPECIFIED BENIGN MAMMARY DYSPLASIA OF LEFT BREAST: ICD-10-CM

## 2024-03-27 PROCEDURE — 77063 BREAST TOMOSYNTHESIS BI: CPT | Mod: 26

## 2024-03-27 PROCEDURE — 77067 SCR MAMMO BI INCL CAD: CPT | Mod: 26

## 2024-03-27 PROCEDURE — 76641 ULTRASOUND BREAST COMPLETE: CPT | Mod: 26,50

## 2024-03-27 PROCEDURE — 77067 SCR MAMMO BI INCL CAD: CPT

## 2024-03-27 PROCEDURE — 77063 BREAST TOMOSYNTHESIS BI: CPT

## 2024-03-27 PROCEDURE — 76641 ULTRASOUND BREAST COMPLETE: CPT

## 2024-04-01 ENCOUNTER — APPOINTMENT (OUTPATIENT)
Dept: SURGERY | Facility: CLINIC | Age: 54
End: 2024-04-01
Payer: MEDICAID

## 2024-04-01 VITALS
TEMPERATURE: 97.7 F | OXYGEN SATURATION: 100 % | BODY MASS INDEX: 24.74 KG/M2 | HEART RATE: 105 BPM | DIASTOLIC BLOOD PRESSURE: 89 MMHG | SYSTOLIC BLOOD PRESSURE: 168 MMHG | WEIGHT: 131.04 LBS | HEIGHT: 61 IN

## 2024-04-01 DIAGNOSIS — B36.9 SUPERFICIAL MYCOSIS, UNSPECIFIED: ICD-10-CM

## 2024-04-01 DIAGNOSIS — N60.92 UNSPECIFIED BENIGN MAMMARY DYSPLASIA OF LEFT BREAST: ICD-10-CM

## 2024-04-01 PROCEDURE — 99213 OFFICE O/P EST LOW 20 MIN: CPT

## 2024-04-01 RX ORDER — NYSTATIN 100000 1/G
100000 POWDER TOPICAL
Qty: 1 | Refills: 3 | Status: ACTIVE | COMMUNITY
Start: 2024-04-01 | End: 1900-01-01

## 2024-04-01 NOTE — PHYSICAL EXAM
[Normocephalic] : normocephalic [EOMI] : extra ocular movement intact [Atraumatic] : atraumatic [PERRL] : pupils equal, round and reactive to light [Sclera nonicteric] : sclera nonicteric [Supple] : supple [No Supraclavicular Adenopathy] : no supraclavicular adenopathy [Examined in the supine and seated position] : examined in the supine and seated position [No Cervical Adenopathy] : no cervical adenopathy [Symmetrical] : symmetrical [Grade 1] : Ptosis Grade 1 [No dominant masses] : no dominant masses left breast [No dominant masses] : no dominant masses in right breast  [No Nipple Retraction] : no left nipple retraction [No Nipple Discharge] : no left nipple discharge [Breast Nipple Inversion] : nipples not inverted [Breast Nipple Retraction] : nipples not retracted [Breast Nipple Flattening] : nipples not flattened [Breast Nipple Fissures] : nipples not fissured [Breast Abnormal Lactation (Galactorrhea)] : no galactorrhea [Breast Abnormal Secretion Bloody Fluid] : no bloody discharge [Breast Abnormal Secretion Serous Fluid] : no serous discharge [Breast Abnormal Secretion Opalescent Fluid] : no milky discharge [No Axillary Lymphadenopathy] : no left axillary lymphadenopathy [No Edema] : no edema [No Rashes] : no rashes [No Ulceration] : no ulceration [Conjunctiva pink] : conjunctiva pink [de-identified] : incision well healed  [de-identified] : Mild inframammary fold fungal dermatitis noted R>L

## 2024-04-01 NOTE — HISTORY OF PRESENT ILLNESS
[FreeTextEntry1] : Problem List: 1.  Left breast atypical ductal hyperplasia       - Left breast MAHAMED  localized excisional biopsy 3/11/22      - Letrozole started 3/24/22 2. Left breast 3:00 biopsy-proven fibroadenoma        CARE TEAM Med Onc - Dr. Leeanna Martinez   INTERVAL HISTORY: (23): 52 year old female presents for 6 month followup. She is still on the letrozole and tolerating well. She has no complaints at this time.   (10/02/23): Patient presents for 6 month follow up. Currently on letrozole tolerating well besides experiencing hair thinning. She has no breast complaints.   (24): Patient presents for 6 month follow up. Denies any breast complaints. She does report some vaginal itching. She was treated for a yeast infection 2 months ago by her GYN, but still experiencing some itching.     HPI: - Patient initially presented in 2022 w/abnormal breast imaging which demonstrated left breast upper outer quadrant calcifications. A stereotactic core biopsy demonstrated calcifications with atypical ductal hyperplasia  BREAST HISTORY: She does occasionally do self-breast exams. She denies breast pain, nipple discharge, or abnormal masses.   [id number]-WLV: 8mm focal NME in the slightly lower, outer left breast. Biopsy recommended. BI-RADS 4A  23 - [facility] [place]: Left Breast MR Biopsy - PATHOLOGY: Breast, Left Lower Outer Mass MRI Guided Core Biopsy = Fibroadenoma, 5 mm.  23 - [facility]: Bilateral Screening Mammogram & Complete Bilateral Breast Ultrasound - IMPRESSION: No mammographic evidence of malignancy. Indeterminate hypoechoic area in the region of previously described complicated cyst in the left breast (3:00), possibly corresponding with region of previous MRI biopsy. Targeted left breast ultrasound is recommended for better evaluation. Probably benign complicated cysts in the left breast (2;00). Targeted left breast ultrasound in 6 months is recommended to demonstrate stability. - BIRADS 0: Incomplete. Needs additional imaging evaluation.   3/24/01-Aesdmwtmv-AN left breast: 3:00 7cm FN is a 1.0 x 0.5 x 0.5cm hypoechoic nodular area with a biopsy clip identified. This correlates with the area of recent MRI-guided biopsy. BI-RADS 2   23: [facility]: Left Breast Ultrasound Limited: Impression - no change left breast 2:00, 4 cm from the nipple finding. Resume annual mammography on schedule. BI-RADS 2   24: [facility]: Screening Mammogram Bilateral and US Breast Complete: Impression - No mammographic or sonographic evidence of malignancy. BI-RADS 2

## 2024-04-18 ENCOUNTER — TRANSCRIPTION ENCOUNTER (OUTPATIENT)
Age: 54
End: 2024-04-18

## 2024-07-01 ENCOUNTER — TRANSCRIPTION ENCOUNTER (OUTPATIENT)
Age: 54
End: 2024-07-01

## 2024-07-10 ENCOUNTER — TRANSCRIPTION ENCOUNTER (OUTPATIENT)
Age: 54
End: 2024-07-10

## 2024-09-10 ENCOUNTER — OUTPATIENT (OUTPATIENT)
Dept: OUTPATIENT SERVICES | Facility: HOSPITAL | Age: 54
LOS: 1 days | End: 2024-09-10

## 2024-09-10 ENCOUNTER — APPOINTMENT (OUTPATIENT)
Dept: MRI IMAGING | Facility: CLINIC | Age: 54
End: 2024-09-10

## 2024-09-10 DIAGNOSIS — Z00.8 ENCOUNTER FOR OTHER GENERAL EXAMINATION: ICD-10-CM

## 2024-09-10 DIAGNOSIS — O00.109 UNSPECIFIED TUBAL PREGNANCY WITHOUT INTRAUTERINE PREGNANCY: Chronic | ICD-10-CM

## 2024-09-10 DIAGNOSIS — Z98.891 HISTORY OF UTERINE SCAR FROM PREVIOUS SURGERY: Chronic | ICD-10-CM

## 2024-09-26 ENCOUNTER — OUTPATIENT (OUTPATIENT)
Dept: OUTPATIENT SERVICES | Facility: HOSPITAL | Age: 54
LOS: 1 days | Discharge: HOME | End: 2024-09-26

## 2024-09-26 DIAGNOSIS — Z98.891 HISTORY OF UTERINE SCAR FROM PREVIOUS SURGERY: Chronic | ICD-10-CM

## 2024-09-26 DIAGNOSIS — N60.92 UNSPECIFIED BENIGN MAMMARY DYSPLASIA OF LEFT BREAST: ICD-10-CM

## 2024-09-26 DIAGNOSIS — O00.109 UNSPECIFIED TUBAL PREGNANCY WITHOUT INTRAUTERINE PREGNANCY: Chronic | ICD-10-CM

## 2024-10-01 ENCOUNTER — APPOINTMENT (OUTPATIENT)
Dept: MRI IMAGING | Facility: CLINIC | Age: 54
End: 2024-10-01
Payer: COMMERCIAL

## 2024-10-01 ENCOUNTER — OUTPATIENT (OUTPATIENT)
Dept: OUTPATIENT SERVICES | Facility: HOSPITAL | Age: 54
LOS: 1 days | End: 2024-10-01
Payer: COMMERCIAL

## 2024-10-01 DIAGNOSIS — O00.109 UNSPECIFIED TUBAL PREGNANCY WITHOUT INTRAUTERINE PREGNANCY: Chronic | ICD-10-CM

## 2024-10-01 DIAGNOSIS — Z98.891 HISTORY OF UTERINE SCAR FROM PREVIOUS SURGERY: Chronic | ICD-10-CM

## 2024-10-01 DIAGNOSIS — Z00.8 ENCOUNTER FOR OTHER GENERAL EXAMINATION: ICD-10-CM

## 2024-10-01 DIAGNOSIS — N60.92 UNSPECIFIED BENIGN MAMMARY DYSPLASIA OF LEFT BREAST: ICD-10-CM

## 2024-10-01 PROCEDURE — C8937: CPT

## 2024-10-01 PROCEDURE — 77049 MRI BREAST C-+ W/CAD BI: CPT | Mod: 26

## 2024-10-01 PROCEDURE — A9585: CPT

## 2024-10-01 PROCEDURE — C8908: CPT

## 2024-10-04 ENCOUNTER — NON-APPOINTMENT (OUTPATIENT)
Age: 54
End: 2024-10-04

## 2024-10-07 ENCOUNTER — APPOINTMENT (OUTPATIENT)
Dept: SURGERY | Facility: CLINIC | Age: 54
End: 2024-10-07
Payer: MEDICAID

## 2024-10-07 DIAGNOSIS — R92.8 OTHER ABNORMAL AND INCONCLUSIVE FINDINGS ON DIAGNOSTIC IMAGING OF BREAST: ICD-10-CM

## 2024-10-07 PROCEDURE — 99213 OFFICE O/P EST LOW 20 MIN: CPT

## 2024-10-07 RX ORDER — NYSTATIN 100000 [USP'U]/G
100000 POWDER TOPICAL
Qty: 1 | Refills: 3 | Status: ACTIVE | COMMUNITY
Start: 2024-10-07 | End: 1900-01-01

## 2024-10-08 ENCOUNTER — APPOINTMENT (OUTPATIENT)
Dept: HEMATOLOGY ONCOLOGY | Facility: CLINIC | Age: 54
End: 2024-10-08
Payer: COMMERCIAL

## 2024-10-08 VITALS
HEART RATE: 96 BPM | WEIGHT: 129 LBS | OXYGEN SATURATION: 100 % | HEIGHT: 61 IN | TEMPERATURE: 97.9 F | BODY MASS INDEX: 24.35 KG/M2

## 2024-10-08 VITALS
WEIGHT: 129 LBS | HEIGHT: 61 IN | OXYGEN SATURATION: 100 % | DIASTOLIC BLOOD PRESSURE: 105 MMHG | BODY MASS INDEX: 24.35 KG/M2 | SYSTOLIC BLOOD PRESSURE: 169 MMHG | TEMPERATURE: 97.9 F | HEART RATE: 93 BPM

## 2024-10-08 DIAGNOSIS — N60.92 UNSPECIFIED BENIGN MAMMARY DYSPLASIA OF LEFT BREAST: ICD-10-CM

## 2024-10-08 DIAGNOSIS — Z79.811 LONG TERM (CURRENT) USE OF AROMATASE INHIBITORS: ICD-10-CM

## 2024-10-08 PROCEDURE — 99214 OFFICE O/P EST MOD 30 MIN: CPT

## 2025-03-25 ENCOUNTER — OUTPATIENT (OUTPATIENT)
Dept: OUTPATIENT SERVICES | Facility: HOSPITAL | Age: 55
LOS: 1 days | Discharge: HOME | End: 2025-03-25

## 2025-03-25 DIAGNOSIS — N60.92 UNSPECIFIED BENIGN MAMMARY DYSPLASIA OF LEFT BREAST: ICD-10-CM

## 2025-03-25 DIAGNOSIS — Z98.891 HISTORY OF UTERINE SCAR FROM PREVIOUS SURGERY: Chronic | ICD-10-CM

## 2025-03-25 DIAGNOSIS — O00.109 UNSPECIFIED TUBAL PREGNANCY WITHOUT INTRAUTERINE PREGNANCY: Chronic | ICD-10-CM

## 2025-03-28 ENCOUNTER — OUTPATIENT (OUTPATIENT)
Dept: OUTPATIENT SERVICES | Facility: HOSPITAL | Age: 55
LOS: 1 days | End: 2025-03-28
Payer: COMMERCIAL

## 2025-03-28 ENCOUNTER — RESULT REVIEW (OUTPATIENT)
Age: 55
End: 2025-03-28

## 2025-03-28 ENCOUNTER — APPOINTMENT (OUTPATIENT)
Dept: ULTRASOUND IMAGING | Facility: CLINIC | Age: 55
End: 2025-03-28

## 2025-03-28 ENCOUNTER — APPOINTMENT (OUTPATIENT)
Dept: MAMMOGRAPHY | Facility: CLINIC | Age: 55
End: 2025-03-28
Payer: COMMERCIAL

## 2025-03-28 DIAGNOSIS — Z98.891 HISTORY OF UTERINE SCAR FROM PREVIOUS SURGERY: Chronic | ICD-10-CM

## 2025-03-28 DIAGNOSIS — Z00.8 ENCOUNTER FOR OTHER GENERAL EXAMINATION: ICD-10-CM

## 2025-03-28 DIAGNOSIS — O00.109 UNSPECIFIED TUBAL PREGNANCY WITHOUT INTRAUTERINE PREGNANCY: Chronic | ICD-10-CM

## 2025-03-28 PROCEDURE — 76641 ULTRASOUND BREAST COMPLETE: CPT | Mod: 26,50

## 2025-03-28 PROCEDURE — 77067 SCR MAMMO BI INCL CAD: CPT | Mod: 26

## 2025-03-28 PROCEDURE — 77063 BREAST TOMOSYNTHESIS BI: CPT | Mod: 26

## 2025-03-28 PROCEDURE — 76641 ULTRASOUND BREAST COMPLETE: CPT

## 2025-03-28 PROCEDURE — 77063 BREAST TOMOSYNTHESIS BI: CPT

## 2025-03-28 PROCEDURE — 77067 SCR MAMMO BI INCL CAD: CPT

## 2025-04-01 ENCOUNTER — APPOINTMENT (OUTPATIENT)
Dept: HEMATOLOGY ONCOLOGY | Facility: CLINIC | Age: 55
End: 2025-04-01
Payer: COMMERCIAL

## 2025-04-01 VITALS
OXYGEN SATURATION: 99 % | SYSTOLIC BLOOD PRESSURE: 157 MMHG | HEIGHT: 61 IN | BODY MASS INDEX: 24.02 KG/M2 | HEART RATE: 100 BPM | DIASTOLIC BLOOD PRESSURE: 91 MMHG | WEIGHT: 127.2 LBS | TEMPERATURE: 97.9 F

## 2025-04-01 DIAGNOSIS — Z79.811 LONG TERM (CURRENT) USE OF AROMATASE INHIBITORS: ICD-10-CM

## 2025-04-01 DIAGNOSIS — N60.92 UNSPECIFIED BENIGN MAMMARY DYSPLASIA OF LEFT BREAST: ICD-10-CM

## 2025-04-01 PROCEDURE — G2211 COMPLEX E/M VISIT ADD ON: CPT | Mod: NC

## 2025-04-01 PROCEDURE — 99214 OFFICE O/P EST MOD 30 MIN: CPT

## 2025-04-07 ENCOUNTER — APPOINTMENT (OUTPATIENT)
Dept: SURGERY | Facility: CLINIC | Age: 55
End: 2025-04-07

## 2025-05-02 ENCOUNTER — OUTPATIENT (OUTPATIENT)
Dept: OUTPATIENT SERVICES | Facility: HOSPITAL | Age: 55
LOS: 1 days | End: 2025-05-02
Payer: COMMERCIAL

## 2025-05-02 ENCOUNTER — APPOINTMENT (OUTPATIENT)
Dept: MRI IMAGING | Facility: CLINIC | Age: 55
End: 2025-05-02
Payer: COMMERCIAL

## 2025-05-02 ENCOUNTER — APPOINTMENT (OUTPATIENT)
Dept: RADIOLOGY | Facility: CLINIC | Age: 55
End: 2025-05-02
Payer: COMMERCIAL

## 2025-05-02 DIAGNOSIS — O00.109 UNSPECIFIED TUBAL PREGNANCY WITHOUT INTRAUTERINE PREGNANCY: Chronic | ICD-10-CM

## 2025-05-02 DIAGNOSIS — Z98.891 HISTORY OF UTERINE SCAR FROM PREVIOUS SURGERY: Chronic | ICD-10-CM

## 2025-05-02 DIAGNOSIS — N60.92 UNSPECIFIED BENIGN MAMMARY DYSPLASIA OF LEFT BREAST: ICD-10-CM

## 2025-05-02 DIAGNOSIS — Z00.8 ENCOUNTER FOR OTHER GENERAL EXAMINATION: ICD-10-CM

## 2025-05-02 PROCEDURE — C8908: CPT

## 2025-05-02 PROCEDURE — C8937: CPT

## 2025-05-02 PROCEDURE — 77080 DXA BONE DENSITY AXIAL: CPT | Mod: 26

## 2025-05-02 PROCEDURE — 77080 DXA BONE DENSITY AXIAL: CPT

## 2025-05-02 PROCEDURE — 77049 MRI BREAST C-+ W/CAD BI: CPT | Mod: 26

## 2025-05-02 PROCEDURE — A9585: CPT

## 2025-05-06 ENCOUNTER — NON-APPOINTMENT (OUTPATIENT)
Age: 55
End: 2025-05-06

## 2025-05-14 ENCOUNTER — APPOINTMENT (OUTPATIENT)
Dept: BREAST CENTER | Facility: CLINIC | Age: 55
End: 2025-05-14
Payer: COMMERCIAL

## 2025-05-14 VITALS
HEART RATE: 92 BPM | WEIGHT: 128.04 LBS | OXYGEN SATURATION: 97 % | SYSTOLIC BLOOD PRESSURE: 147 MMHG | HEIGHT: 61 IN | DIASTOLIC BLOOD PRESSURE: 80 MMHG | BODY MASS INDEX: 24.17 KG/M2

## 2025-05-14 PROCEDURE — 99203 OFFICE O/P NEW LOW 30 MIN: CPT

## 2025-06-05 NOTE — ASU DISCHARGE PLAN (ADULT/PEDIATRIC) - MODE OF TRANSPORTATION
"  Problem: Adult Inpatient Plan of Care  Goal: Plan of Care Review  Outcome: Progressing  Flowsheets (Taken 6/5/2025 0615)  Plan of Care Reviewed With: patient  Goal: Patient-Specific Goal (Individualized)  Outcome: Progressing  Flowsheets (Taken 6/5/2025 0615)  Individualized Care Needs: IV abx, oxygen therapy, accuchecks, prn cough medication  Anxieties, Fears or Concerns: overall "feeling bad"  Patient/Family-Specific Goals (Include Timeframe): to be free of infection by D/C     Problem: Infection  Goal: Absence of Infection Signs and Symptoms  Outcome: Progressing  Intervention: Prevent or Manage Infection  Flowsheets (Taken 6/5/2025 0615)  Fever Reduction/Comfort Measures:   lightweight clothing   lightweight bedding  Infection Management: aseptic technique maintained  Isolation Precautions:   precautions maintained   droplet     " Ambulatory

## 2025-07-03 ENCOUNTER — TRANSCRIPTION ENCOUNTER (OUTPATIENT)
Age: 55
End: 2025-07-03

## 2025-09-16 ENCOUNTER — APPOINTMENT (OUTPATIENT)
Dept: HEMATOLOGY ONCOLOGY | Facility: CLINIC | Age: 55
End: 2025-09-16
Payer: COMMERCIAL

## 2025-09-16 VITALS
OXYGEN SATURATION: 98 % | HEART RATE: 90 BPM | DIASTOLIC BLOOD PRESSURE: 92 MMHG | WEIGHT: 127 LBS | SYSTOLIC BLOOD PRESSURE: 169 MMHG | HEIGHT: 61 IN | BODY MASS INDEX: 23.98 KG/M2 | TEMPERATURE: 99.7 F

## 2025-09-16 DIAGNOSIS — N60.92 UNSPECIFIED BENIGN MAMMARY DYSPLASIA OF LEFT BREAST: ICD-10-CM

## 2025-09-16 DIAGNOSIS — Z79.811 LONG TERM (CURRENT) USE OF AROMATASE INHIBITORS: ICD-10-CM

## 2025-09-16 PROCEDURE — 99214 OFFICE O/P EST MOD 30 MIN: CPT
